# Patient Record
Sex: FEMALE | Race: WHITE | NOT HISPANIC OR LATINO | Employment: PART TIME | URBAN - METROPOLITAN AREA
[De-identification: names, ages, dates, MRNs, and addresses within clinical notes are randomized per-mention and may not be internally consistent; named-entity substitution may affect disease eponyms.]

---

## 2017-04-27 ENCOUNTER — INITIAL PRENATAL (OUTPATIENT)
Dept: OBSTETRICS AND GYNECOLOGY | Facility: CLINIC | Age: 27
End: 2017-04-27
Payer: MEDICAID

## 2017-04-27 VITALS
BODY MASS INDEX: 39.09 KG/M2 | SYSTOLIC BLOOD PRESSURE: 116 MMHG | WEIGHT: 257.06 LBS | DIASTOLIC BLOOD PRESSURE: 68 MMHG

## 2017-04-27 DIAGNOSIS — Z67.91 RH NEGATIVE STATUS DURING PREGNANCY IN FIRST TRIMESTER, ANTEPARTUM: ICD-10-CM

## 2017-04-27 DIAGNOSIS — O26.891 RH NEGATIVE STATUS DURING PREGNANCY IN FIRST TRIMESTER, ANTEPARTUM: ICD-10-CM

## 2017-04-27 DIAGNOSIS — O21.9 NAUSEA AND VOMITING IN PREGNANCY: ICD-10-CM

## 2017-04-27 DIAGNOSIS — Z32.00 POSSIBLE PREGNANCY, NOT CONFIRMED: Primary | ICD-10-CM

## 2017-04-27 LAB
B-HCG UR QL: POSITIVE
CTP QC/QA: YES

## 2017-04-27 PROCEDURE — 81025 URINE PREGNANCY TEST: CPT | Mod: PBBFAC | Performed by: OBSTETRICS & GYNECOLOGY

## 2017-04-27 PROCEDURE — 99999 PR PBB SHADOW E&M-EST. PATIENT-LVL II: CPT | Mod: PBBFAC,,, | Performed by: OBSTETRICS & GYNECOLOGY

## 2017-04-27 PROCEDURE — 99204 OFFICE O/P NEW MOD 45 MIN: CPT | Mod: TH,S$PBB,, | Performed by: OBSTETRICS & GYNECOLOGY

## 2017-04-27 PROCEDURE — 99212 OFFICE O/P EST SF 10 MIN: CPT | Mod: PBBFAC | Performed by: OBSTETRICS & GYNECOLOGY

## 2017-04-27 PROCEDURE — 87591 N.GONORRHOEAE DNA AMP PROB: CPT

## 2017-04-27 RX ORDER — PROMETHAZINE HYDROCHLORIDE 25 MG/1
25 TABLET ORAL EVERY 4 HOURS
Qty: 30 TABLET | Refills: 1 | Status: SHIPPED | OUTPATIENT
Start: 2017-04-27 | End: 2017-10-02

## 2017-04-27 NOTE — MR AVS SNAPSHOT
[facility] OB/  [address]  [facility] [address]  Phone: [phone]                  Charlene uBrgess   2017 1:40 PM   Initial Prenatal    Description:  Female : 1990   Provider:  Trenton Li MD   Department:  [facility] [place] - OB/ GYN           Reason for Visit     Initial Prenatal Visit           Diagnoses this Visit        Comments    Possible pregnancy, not confirmed    -  Primary     Rh negative status during pregnancy in first trimester, antepartum         Nausea and vomiting in pregnancy                To Do List           Future Appointments        Provider Department Dept Phone    2017 9:00 AM Trenton Li MD [facility] OB/ -[phone]      Goals (5 Years of Data)     None      Follow-Up and Disposition     Return in about 2 weeks (around 2017).       These Medications        Disp Refills Start End    prenatal vit27,calcium-iron-FA (VINATE ONE) 60 mg iron-1 mg Tab 30 tablet 6 2017    Take 1 capsule by mouth once daily. - Oral    Pharmacy: [facility] - [address] Ph #: [phone]       Notes to Pharmacy: Please give patient medication covered by her insurance    promethazine (PHENERGAN) 25 MG tablet 30 tablet 1 2017     Take 1 tablet (25 mg total) by mouth every 4 (four) hours. - Oral    Pharmacy: [facility] - [address] Ph #: [phone]         [facility]     [facility] On [facility] Care Line -  Assistance  Unless otherwise directed by your provider, please contact [facility], our nurse care line that is available for  assistance.     Registered nurses in the [facility] provide: appointment scheduling, clinical advisement, health education, and other advisory services.  Call: [phone] (toll free)               Medications           Message regarding Medications     Verify the changes and/or  additions to your medication regime listed below are the same as discussed with your clinician today.  If any of these changes or additions are incorrect, please notify your healthcare provider.        START taking these NEW medications        Refills    prenatal vit27,calcium-iron-FA (VINATE ONE) 60 mg iron-1 mg Tab 6    Sig: Take 1 capsule by mouth once daily.    Class: Normal    Route: Oral    promethazine (PHENERGAN) 25 MG tablet 1    Sig: Take 1 tablet (25 mg total) by mouth every 4 (four) hours.    Class: Normal    Route: Oral      STOP taking these medications     ondansetron (ZOFRAN) 4 MG tablet Take 1 tablet (4 mg total) by mouth every 8 (eight) hours as needed.    multivitamin (THERAGRAN) per tablet Take 1 tablet by mouth once daily.           Verify that the below list of medications is an accurate representation of the medications you are currently taking.  If none reported, the list may be blank. If incorrect, please contact your healthcare provider. Carry this list with you in case of emergency.           Current Medications     prenatal vit27,calcium-iron-FA (VINATE ONE) 60 mg iron-1 mg Tab Take 1 capsule by mouth once daily.    promethazine (PHENERGAN) 25 MG tablet Take 1 tablet (25 mg total) by mouth every 4 (four) hours.           Clinical Reference Information           Prenatal Vitals     Enc. Date GA Prenatal Vitals Prenatal Pulse Pain Level Urine Albumin/Glucose Edema Presentation Dilation/Effacement/Station    4/27/17 11w4d 116/68 / 116.6 kg (257 lb 0.9 oz)   0 Negative / Negative          TWG: 3.7 kg (8 lb 2.5 oz)   Pregravid weight: 112.9 kg (248 lb 14.4 oz)   Number of fetuses: 1       Your Vitals Were     BP Weight Last Period BMI       116/68 116.6 kg (257 lb 0.9 oz) 02/05/2017 (Exact Date) 39.09 kg/m2       Allergies as of 4/27/2017     Amoxicillin    Penicillins      Immunizations Administered on Date of Encounter - 4/27/2017     None      Orders Placed During Today's Visit      Normal  Orders This Visit    C. trachomatis/N. gonorrhoeae by AMP DNA Cervix     POCT urine pregnancy          4/27/2017  1:58 PM - Shamika Martinez MA      Component Results     Component Value Flag Ref Range Units Status    POC Preg Test, Ur Positive (A) Negative  Final     Acceptable Yes    Final            Language Assistance Services     ATTENTION: Language assistance services are available, free of charge. Please call [phone].      ATENCIÓN: Si habla español, tiene a bellamy disposición servicios gratuitos de asistencia lingüística. Llame al [phone].     [facility] Ý: N?u b?n nói Ti?ng Vi?t, có các d?ch v? h? tr? ngôn ng? mi?n phí dành cho b?n. G?i s? [phone].         [facility] [place] - OB/ GYN complies with applicable Federal civil rights laws and does not discriminate on the basis of race, color, national origin, age, disability, or sex.

## 2017-04-27 NOTE — PROGRESS NOTES
NOB here for initial prenatal care.  LMP: 2/5/17  JADON: 11/12/17 EGA: 11w 4d.  Pt went to [facility] on 04/15/2017 for pelvic pain with no bleeding but no longer with pains. batsheva

## 2017-04-27 NOTE — PROGRESS NOTES
Patient comes in today with her partner to begin care  Significant nausea  Previous ultrasound earlier in this pregnancy at [facility] shows possible twins.  No EDC given    Past Medical History:   Diagnosis Date    Kidney stone        Past Surgical History:   Procedure Laterality Date     SECTION      DILATION AND CURETTAGE OF UTERUS      WRIST SURGERY  left wrist    occured at age 13    WRIST SURGERY         Family History   Problem Relation Age of Onset    Diabetes Neg Hx     Hypertension Neg Hx        Social History     Social History    Marital status: Legally      Spouse name: N/A    Number of children: N/A    Years of education: N/A     Social History Main Topics    Smoking status: Former Smoker     Packs/day: 0.25     Types: Cigarettes    Smokeless tobacco: Never Used    Alcohol use No    Drug use: No    Sexual activity: Yes     Partners: Male     Other Topics Concern    None     Social History Narrative    Together since 2016       No current outpatient prescriptions on file.     No current facility-administered medications for this visit.        Review of patient's allergies indicates:   Allergen Reactions    Amoxicillin Anaphylaxis    Penicillins Itching     Unknown reaction     Review of Systems   Constitutional: Positive for fatigue. Negative for fever, chills, appetite change and unexpected weight change.   HENT: Positive for congestion. Negative for hearing loss, ear pain, sore throat, rhinorrhea, sneezing, mouth sores, neck pain, neck stiffness, voice change and postnasal drip.   Eyes: Negative for photophobia, itching and visual disturbance.   Respiratory: Negative for cough, chest tightness, shortness of breath and wheezing.   Cardiovascular: Negative for chest pain, palpitations and leg swelling.   Gastrointestinal: Positive for nausea, vomiting, abdominal pain and constipation. Negative for diarrhea, blood in stool and abdominal  "distention.   Genitourinary: Positive for vaginal discharge and pelvic pain. Negative for dysuria, urgency, frequency, hematuria, flank pain, decreased urine volume, vaginal bleeding, difficulty urinating, genital sores, vaginal pain, menstrual problem and dyspareunia.   Musculoskeletal: Positive for back pain. Negative for myalgias and joint swelling.   Skin: Negative for rash and wound.   Neurological: Positive for headaches. Negative for dizziness, tremors, syncope, speech difficulty, weakness, light-headedness and numbness.   Hematological: Negative for adenopathy. Does not bruise/bleed easily.   Psychiatric/Behavioral: Negative for suicidal ideas, hallucinations, confusion, sleep disturbance, dysphoric mood, decreased concentration and agitation. The patient is not nervous/anxious and is not hyperactive.     Exam with  Enlarged uterus at 12-14 weeks.  No FHT    A quick ultrasound performed.  Empty gestational sac seen abdominally.  Transvaginal ultrasound.  Viable fetus at 7w2d consistent with EDC of 12/12/2017    Patient states that her LMP "might be in March 2017"    Prenatal profile  Prenatal vitamins  Phenergan for nausea  Back in 2 weeks.    Diclegis sample given  "

## 2017-04-28 LAB
C TRACH DNA SPEC QL NAA+PROBE: NOT DETECTED
N GONORRHOEA DNA SPEC QL NAA+PROBE: NOT DETECTED

## 2017-05-04 ENCOUNTER — TELEPHONE (OUTPATIENT)
Dept: OBSTETRICS AND GYNECOLOGY | Facility: CLINIC | Age: 27
End: 2017-05-04

## 2017-05-04 NOTE — TELEPHONE ENCOUNTER
----- Message from Anna Fuchs sent at 5/4/2017  1:42 PM CDT -----  Contact: self  Pt returned office call        Thanks  ----------------------------------------------------------------------------  5/4/17 @ 4800  SPOKE WITH MS ZHAO, SHE STATED SHE IS HAVING A COLD/ALLERGY ATTACK, INSTRUCTED HER ON THE USE OF TYLENOL FOR A HEADACHE, FOR SINUS SHE CAN USE SUDAFED, BENADRYL, ACTIFED, CLARITIN , ZYRTEC W/O THE D.M. SHE CAN USE AFRIN NASAL SPRAY AS DIRECTED ONLY FOR 5 DAYS,  INFORMED HER THAT SHE CAN ALSO USE HOT TEA WITH LOCAL HONEY AND LEMON, PT STATED SHE IS ALSO USING MONISTAT CREAM FOR A YEAST INFECTION , INSTRUCTED HER THAT IS OK. PT STATED HER UNDERSTANDING

## 2017-05-04 NOTE — TELEPHONE ENCOUNTER
----- Message from Shayy Driscoll sent at 5/4/2017 11:19 AM CDT -----  Contact: self  Has congestion & headaches  . She thinks she is coming down with acold . Can something be called in ?   [phone]   LL

## 2017-05-12 ENCOUNTER — LAB VISIT (OUTPATIENT)
Dept: LAB | Facility: HOSPITAL | Age: 27
End: 2017-05-12
Attending: OBSTETRICS & GYNECOLOGY
Payer: MEDICAID

## 2017-05-12 ENCOUNTER — ROUTINE PRENATAL (OUTPATIENT)
Dept: OBSTETRICS AND GYNECOLOGY | Facility: CLINIC | Age: 27
End: 2017-05-12
Payer: MEDICAID

## 2017-05-12 VITALS
WEIGHT: 255.75 LBS | SYSTOLIC BLOOD PRESSURE: 110 MMHG | BODY MASS INDEX: 38.88 KG/M2 | DIASTOLIC BLOOD PRESSURE: 66 MMHG

## 2017-05-12 DIAGNOSIS — O26.891 RH NEGATIVE STATUS DURING PREGNANCY IN FIRST TRIMESTER, ANTEPARTUM: ICD-10-CM

## 2017-05-12 DIAGNOSIS — O26.841 UTERINE SIZE DATE DISCREPANCY, FIRST TRIMESTER: Primary | ICD-10-CM

## 2017-05-12 DIAGNOSIS — Z3A.09 9 WEEKS GESTATION OF PREGNANCY: ICD-10-CM

## 2017-05-12 DIAGNOSIS — O34.219 PREVIOUS CESAREAN SECTION COMPLICATING PREGNANCY, ANTEPARTUM CONDITION OR COMPLICATION: ICD-10-CM

## 2017-05-12 DIAGNOSIS — Z67.91 RH NEGATIVE STATUS DURING PREGNANCY IN FIRST TRIMESTER, ANTEPARTUM: ICD-10-CM

## 2017-05-12 DIAGNOSIS — O23.591 VAGINITIS AFFECTING PREGNANCY IN FIRST TRIMESTER, ANTEPARTUM: ICD-10-CM

## 2017-05-12 LAB
ABO + RH BLD: NORMAL
BASOPHILS # BLD AUTO: 0.01 K/UL
BASOPHILS NFR BLD: 0.1 %
BLD GP AB SCN CELLS X3 SERPL QL: NORMAL
DIFFERENTIAL METHOD: ABNORMAL
EOSINOPHIL # BLD AUTO: 0 K/UL
EOSINOPHIL NFR BLD: 0.6 %
ERYTHROCYTE [DISTWIDTH] IN BLOOD BY AUTOMATED COUNT: 12.8 %
HCT VFR BLD AUTO: 38.2 %
HGB BLD-MCNC: 12.4 G/DL
HGB S BLD QL SOLY: NEGATIVE
LYMPHOCYTES # BLD AUTO: 1 K/UL
LYMPHOCYTES NFR BLD: 15.1 %
MCH RBC QN AUTO: 28.5 PG
MCHC RBC AUTO-ENTMCNC: 32.5 %
MCV RBC AUTO: 88 FL
MONOCYTES # BLD AUTO: 0.5 K/UL
MONOCYTES NFR BLD: 7.3 %
NEUTROPHILS # BLD AUTO: 5.2 K/UL
NEUTROPHILS NFR BLD: 76.9 %
PLATELET # BLD AUTO: 255 K/UL
PMV BLD AUTO: 9.6 FL
RBC # BLD AUTO: 4.35 M/UL
WBC # BLD AUTO: 6.74 K/UL

## 2017-05-12 PROCEDURE — 99212 OFFICE O/P EST SF 10 MIN: CPT | Mod: TH,S$PBB,25, | Performed by: OBSTETRICS & GYNECOLOGY

## 2017-05-12 PROCEDURE — 86900 BLOOD TYPING SEROLOGIC ABO: CPT

## 2017-05-12 PROCEDURE — 87340 HEPATITIS B SURFACE AG IA: CPT

## 2017-05-12 PROCEDURE — 83036 HEMOGLOBIN GLYCOSYLATED A1C: CPT

## 2017-05-12 PROCEDURE — 36415 COLL VENOUS BLD VENIPUNCTURE: CPT

## 2017-05-12 PROCEDURE — 85025 COMPLETE CBC W/AUTO DIFF WBC: CPT

## 2017-05-12 PROCEDURE — 86703 HIV-1/HIV-2 1 RESULT ANTBDY: CPT

## 2017-05-12 PROCEDURE — 76817 TRANSVAGINAL US OBSTETRIC: CPT | Mod: 26,S$PBB,, | Performed by: OBSTETRICS & GYNECOLOGY

## 2017-05-12 PROCEDURE — 85660 RBC SICKLE CELL TEST: CPT

## 2017-05-12 PROCEDURE — 86901 BLOOD TYPING SEROLOGIC RH(D): CPT

## 2017-05-12 PROCEDURE — 86592 SYPHILIS TEST NON-TREP QUAL: CPT

## 2017-05-12 PROCEDURE — 86762 RUBELLA ANTIBODY: CPT

## 2017-05-12 PROCEDURE — 99999 PR PBB SHADOW E&M-EST. PATIENT-LVL II: CPT | Mod: PBBFAC,,, | Performed by: OBSTETRICS & GYNECOLOGY

## 2017-05-12 PROCEDURE — 86803 HEPATITIS C AB TEST: CPT

## 2017-05-12 NOTE — MR AVS SNAPSHOT
[facility] - OB/ GYN  [address]  [place] [address]  Phone: [phone]                  Charlene Burgess   2017 9:00 AM   Routine Prenatal    Description:  Female : 1990   Provider:  Trenton Li MD   Department:  [facility] [place] - OB/ GYN           Reason for Visit     Pregnancy Ultrasound                To Do List           Future Appointments        Provider Department Dept Phone    2017 10:00 AM Trenton Li MD [facility] [place] [facility]/ -[phone]      Goals (5 Years of Data)     None      [facility]     [facility] On [facility] Nurse Care Line -  Assistance  Unless otherwise directed by your provider, please contact [facility], our nurse care line that is available for  assistance.     Registered nurses in the [facility] provide: appointment scheduling, clinical advisement, health education, and other advisory services.  Call: [phone] (toll free)               Medications           Message regarding Medications     Verify the changes and/or additions to your medication regime listed below are the same as discussed with your clinician today.  If any of these changes or additions are incorrect, please notify your healthcare provider.             Verify that the below list of medications is an accurate representation of the medications you are currently taking.  If none reported, the list may be blank. If incorrect, please contact your healthcare provider. Carry this list with you in case of emergency.           Current Medications     prenatal vit27,calcium-iron-FA (VINATE ONE) 60 mg iron-1 mg Tab Take 1 capsule by mouth once daily.    promethazine (PHENERGAN) 25 MG tablet Take 1 tablet (25 mg total) by mouth every 4 (four) hours.           Clinical Reference Information           Prenatal Vitals     Enc. Date GA Prenatal Vitals Prenatal Pulse Pain Level Urine Albumin/Glucose Edema Presentation Dilation/Effacement/Station    17 13w5d 110/66 /  116 kg (255 lb 11.7 oz)   0 Negative / Negative       4/27/17 11w4d 116/68 / 116.6 kg (257 lb 0.9 oz)   0 Negative / Negative          TWG: 3.1 kg (6 lb 13.4 oz)   Pregravid weight: 112.9 kg (248 lb 14.4 oz)   Number of fetuses: 1       Your Vitals Were     BP Weight Last Period BMI       110/66 116 kg (255 lb 11.7 oz) 02/05/2017 (Exact Date) 38.88 kg/m2       Allergies as of 5/12/2017     Amoxicillin    Penicillins      Immunizations Administered on Date of Encounter - 5/12/2017     None      Language Assistance Services     ATTENTION: Language assistance services are available, free of charge. Please call [phone].      ATENCIÓN: Si jacksonla cal, tiene a bellamy disposición servicios gratuitos de asistencia lingüística. Llame al [phone].     [facility] Ý: N?u b?n nói Ti?ng Vi?t, có các d?ch v? h? tr? ngôn ng? mi?n phí dành cho b?n. G?i s? [phone].         [facility] [place] [facility]/ [facility]YN complies with applicable Federal civil rights laws and does not discriminate on the basis of race, color, national origin, age, disability, or sex.

## 2017-05-12 NOTE — PROGRESS NOTES
No new complaint  Still with some discharge and vulva irritation    Past Medical History:   Diagnosis Date    Kidney stone        Past Surgical History:   Procedure Laterality Date     SECTION      DILATION AND CURETTAGE OF UTERUS      WRIST SURGERY  left wrist    occured at age 13    WRIST SURGERY         Family History   Problem Relation Age of Onset    Diabetes Neg Hx     Hypertension Neg Hx        Social History     Social History    Marital status: Legally      Spouse name: N/A    Number of children: N/A    Years of education: N/A     Social History Main Topics    Smoking status: Former Smoker     Packs/day: 0.25     Types: Cigarettes    Smokeless tobacco: Never Used    Alcohol use No    Drug use: No    Sexual activity: Yes     Partners: Male     Other Topics Concern    None     Social History Narrative    Together since 2016       Current Outpatient Prescriptions   Medication Sig Dispense Refill    prenatal vit27,calcium-iron-FA (VINATE ONE) 60 mg iron-1 mg Tab Take 1 capsule by mouth once daily. 30 tablet 6    promethazine (PHENERGAN) 25 MG tablet Take 1 tablet (25 mg total) by mouth every 4 (four) hours. 30 tablet 1     No current facility-administered medications for this visit.        Review of patient's allergies indicates:   Allergen Reactions    Amoxicillin Anaphylaxis    Penicillins Itching     Unknown reaction     Exam normal.  Minimal discharge    A transvaginal ultrasound performed showing normal fetus at appropriate size.  We will keep her EDC on 12/10/2017  RCS at 2017    Prenatal profile  Prenatal vitamins  Back in 4 weeks

## 2017-05-13 ENCOUNTER — TELEPHONE (OUTPATIENT)
Dept: OBSTETRICS AND GYNECOLOGY | Facility: CLINIC | Age: 27
End: 2017-05-13

## 2017-05-13 DIAGNOSIS — B95.1 GBS (GROUP B STREPTOCOCCUS) UTI COMPLICATING PREGNANCY, FIRST TRIMESTER: Primary | ICD-10-CM

## 2017-05-13 DIAGNOSIS — O23.41 GBS (GROUP B STREPTOCOCCUS) UTI COMPLICATING PREGNANCY, FIRST TRIMESTER: Primary | ICD-10-CM

## 2017-05-13 LAB
BACTERIA UR CULT: NORMAL
CANDIDA RRNA VAG QL PROBE: NEGATIVE
G VAGINALIS RRNA GENITAL QL PROBE: POSITIVE
RPR SER QL: NORMAL
T VAGINALIS RRNA GENITAL QL PROBE: NEGATIVE

## 2017-05-13 RX ORDER — CLINDAMYCIN HYDROCHLORIDE 300 MG/1
300 CAPSULE ORAL EVERY 8 HOURS
Qty: 21 CAPSULE | Refills: 0 | Status: SHIPPED | OUTPATIENT
Start: 2017-05-13 | End: 2017-05-20

## 2017-05-14 LAB
ESTIMATED AVG GLUCOSE: 97 MG/DL
HBA1C MFR BLD HPLC: 5 %

## 2017-05-15 LAB
HBV SURFACE AG SERPL QL IA: NEGATIVE
HCV AB SERPL QL IA: NEGATIVE
HIV 1+2 AB+HIV1 P24 AG SERPL QL IA: NEGATIVE
RUBV IGG SER-ACNC: 103 IU/ML
RUBV IGG SER-IMP: REACTIVE

## 2017-05-21 ENCOUNTER — NURSE TRIAGE (OUTPATIENT)
Dept: ADMINISTRATIVE | Facility: CLINIC | Age: 27
End: 2017-05-21

## 2017-05-21 NOTE — TELEPHONE ENCOUNTER
"  Reason for Disposition   Mild constipation (all triage questions negative)   Last bowel movement (BM) > 4 days ago    Answer Assessment - Initial Assessment Questions  1. STOOL PATTERN OR FREQUENCY: "How often do you pass bowel movements (BMs)?"  (Normal range: tid to q 3 days)  "When was the last BM passed?"        q 3 days  2. STRAINING: "Do you have to strain to have a BM?"      yes  3. RECTAL PAIN: "Does your rectum hurt when the stool comes out?" If so, ask: "Do you have hemorrhoids? How bad is the pain?"  (Scale 1-10; or mild, moderate, severe)      Moderate-severe  4. STOOL COMPOSITION: "Are the stools hard?"       unknown  5. BLOOD ON STOOLS: "Has there been any blood on the toilet tissue or on the surface of the BM?" If so, ask: "When was the last time?"     no  6. CHRONIC CONSTIPATION: "Is this a new problem for you?"  If no, ask: How long have you had this problem?" (days, weeks, months)     One week  7. CHANGES IN DIET: "Have there been any recent changes in your diet?"      no  8. MEDICATIONS: "Have you been taking any new medications?" "What type of prenatal vitamins are you taking?"      Yes PNV  9. LAXATIVES: "Have you been using any laxatives or enemas?"  If yes, ask "What, how often, and when was the last time?"  no  10. OTHER SYMPTOMS: "Do you have any other symptoms?" (e.g., abdominal pain, fever, vaginal bleeding, decreased fetal movement)     Upper abdominal pain, gassy  11. JADON: "What date are you expecting to deliver?"      12/5/17    Protocols used:  PREGNANCY - CONSTIPATION-A-AH    "

## 2017-05-22 ENCOUNTER — TELEPHONE (OUTPATIENT)
Dept: OBSTETRICS AND GYNECOLOGY | Facility: CLINIC | Age: 27
End: 2017-05-22

## 2017-05-22 NOTE — TELEPHONE ENCOUNTER
----- Message from Shayy Driscoll sent at 5/22/2017 11:23 AM CDT -----  Contact: SELF  Is there anything she can take for constipation ?       [phone]    LL

## 2017-05-22 NOTE — TELEPHONE ENCOUNTER
Called and spoke with pt regarding her issue.  Pt was asked if she has looked into the OB bag given to her on her last visit.  Pt admitted that she has not.  She was instructed to look at the materials in the bag and also, she can take any laxative that is OTC for quick relief since she's been constipated for 5 days now.  Pt is recommended to increase fiber and water in her daily diet.  Pt voiced understanding and will call if needs any advice soon. batsheva

## 2017-05-23 ENCOUNTER — ROUTINE PRENATAL (OUTPATIENT)
Dept: OBSTETRICS AND GYNECOLOGY | Facility: CLINIC | Age: 27
End: 2017-05-23
Payer: MEDICAID

## 2017-05-23 ENCOUNTER — TELEPHONE (OUTPATIENT)
Dept: OBSTETRICS AND GYNECOLOGY | Facility: CLINIC | Age: 27
End: 2017-05-23

## 2017-05-23 VITALS
BODY MASS INDEX: 39.05 KG/M2 | DIASTOLIC BLOOD PRESSURE: 78 MMHG | SYSTOLIC BLOOD PRESSURE: 116 MMHG | WEIGHT: 256.81 LBS

## 2017-05-23 DIAGNOSIS — O23.41 GBS (GROUP B STREPTOCOCCUS) UTI COMPLICATING PREGNANCY, FIRST TRIMESTER: ICD-10-CM

## 2017-05-23 DIAGNOSIS — B95.1 GBS (GROUP B STREPTOCOCCUS) UTI COMPLICATING PREGNANCY, FIRST TRIMESTER: ICD-10-CM

## 2017-05-23 DIAGNOSIS — O21.9 NAUSEA AND VOMITING IN PREGNANCY: ICD-10-CM

## 2017-05-23 DIAGNOSIS — Z67.91 RH NEGATIVE STATUS DURING PREGNANCY IN FIRST TRIMESTER, ANTEPARTUM: ICD-10-CM

## 2017-05-23 DIAGNOSIS — O34.219 PREVIOUS CESAREAN SECTION COMPLICATING PREGNANCY, ANTEPARTUM CONDITION OR COMPLICATION: Primary | ICD-10-CM

## 2017-05-23 DIAGNOSIS — O26.891 RH NEGATIVE STATUS DURING PREGNANCY IN FIRST TRIMESTER, ANTEPARTUM: ICD-10-CM

## 2017-05-23 DIAGNOSIS — Z3A.11 11 WEEKS GESTATION OF PREGNANCY: ICD-10-CM

## 2017-05-23 PROCEDURE — 99212 OFFICE O/P EST SF 10 MIN: CPT | Mod: PBBFAC | Performed by: OBSTETRICS & GYNECOLOGY

## 2017-05-23 PROCEDURE — 99999 PR PBB SHADOW E&M-EST. PATIENT-LVL II: CPT | Mod: PBBFAC,,, | Performed by: OBSTETRICS & GYNECOLOGY

## 2017-05-23 PROCEDURE — 99213 OFFICE O/P EST LOW 20 MIN: CPT | Mod: TH,S$PBB,, | Performed by: OBSTETRICS & GYNECOLOGY

## 2017-05-23 NOTE — TELEPHONE ENCOUNTER
----- Message from Karleedesmond Robb sent at 5/23/2017  8:35 AM CDT -----  Contact: self   Pt called requesting an appointment today with Dr. Li. She said that she is having stomach pain and vomiting since last night and is feeling very week. She is 11 wks pregnant. Please contact the pt at [phone]. Thanks!

## 2017-05-23 NOTE — PROGRESS NOTES
In pain.  Tired.  Nausea and vomiting  Antiemetics not working.  Just sedating    Exam with normal active fetus    Samples of Diclegis  Off of work until next week  Back next week

## 2017-05-23 NOTE — LETTER
May 23, 2017               [facility] OB/ GYN  Obstetrics and Gynecology  [address]  [place] [address]  Phone: [phone]   May 23, 2017     Patient: Charlene Burgess   YOB: 1990   Date of Visit: 5/23/2017       To Whom it May Concern:    Charlene Burgess was seen in my clinic on 5/23/2017. It is recommended that she is placed on bedrest for the rest of the week.   She will follow up with me on Monday 05/29/17 and will be release then.    If you have any questions or concerns, please don't hesitate to call.    Sincerely,           Trenton Li M.D.

## 2017-05-29 ENCOUNTER — ROUTINE PRENATAL (OUTPATIENT)
Dept: OBSTETRICS AND GYNECOLOGY | Facility: CLINIC | Age: 27
End: 2017-05-29
Payer: MEDICAID

## 2017-05-29 VITALS
SYSTOLIC BLOOD PRESSURE: 116 MMHG | DIASTOLIC BLOOD PRESSURE: 72 MMHG | WEIGHT: 257.06 LBS | BODY MASS INDEX: 39.09 KG/M2

## 2017-05-29 DIAGNOSIS — O34.219 PREVIOUS CESAREAN SECTION COMPLICATING PREGNANCY, ANTEPARTUM CONDITION OR COMPLICATION: Primary | ICD-10-CM

## 2017-05-29 DIAGNOSIS — R82.71 GBS BACTERIURIA: ICD-10-CM

## 2017-05-29 DIAGNOSIS — Z67.91 RH NEGATIVE STATUS DURING PREGNANCY IN FIRST TRIMESTER, ANTEPARTUM: ICD-10-CM

## 2017-05-29 DIAGNOSIS — O26.891 RH NEGATIVE STATUS DURING PREGNANCY IN FIRST TRIMESTER, ANTEPARTUM: ICD-10-CM

## 2017-05-29 DIAGNOSIS — Z3A.12 12 WEEKS GESTATION OF PREGNANCY: ICD-10-CM

## 2017-05-29 PROCEDURE — 99999 PR PBB SHADOW E&M-EST. PATIENT-LVL II: CPT | Mod: PBBFAC,,, | Performed by: OBSTETRICS & GYNECOLOGY

## 2017-05-29 PROCEDURE — 99212 OFFICE O/P EST SF 10 MIN: CPT | Mod: PBBFAC | Performed by: OBSTETRICS & GYNECOLOGY

## 2017-05-29 PROCEDURE — 99213 OFFICE O/P EST LOW 20 MIN: CPT | Mod: TH,S$PBB,, | Performed by: OBSTETRICS & GYNECOLOGY

## 2017-05-29 RX ORDER — VITAMIN A ACETATE, BETA CAROTENE, ASCORBIC ACID, CHOLECALCIFEROL, .ALPHA.-TOCOPHEROL ACETATE, DL-, THIAMINE MONONITRATE, RIBOFLAVIN, NIACINAMIDE, PYRIDOXINE HYDROCHLORIDE, FOLIC ACID, CYANOCOBALAMIN, CALCIUM CARBONATE, FERROUS FUMARATE, ZINC OXIDE, CUPRIC OXIDE 3080; 12; 120; 400; 1; 1.84; 3; 20; 22; 920; 25; 200; 27; 10; 2 [IU]/1; UG/1; MG/1; [IU]/1; MG/1; MG/1; MG/1; MG/1; MG/1; [IU]/1; MG/1; MG/1; MG/1; MG/1; MG/1
TABLET, FILM COATED ORAL
COMMUNITY
Start: 2017-04-27 | End: 2017-06-26 | Stop reason: SDUPTHER

## 2017-06-26 ENCOUNTER — LAB VISIT (OUTPATIENT)
Dept: LAB | Facility: HOSPITAL | Age: 27
End: 2017-06-26
Attending: OBSTETRICS & GYNECOLOGY
Payer: MEDICAID

## 2017-06-26 ENCOUNTER — ROUTINE PRENATAL (OUTPATIENT)
Dept: OBSTETRICS AND GYNECOLOGY | Facility: CLINIC | Age: 27
End: 2017-06-26
Payer: MEDICAID

## 2017-06-26 VITALS
SYSTOLIC BLOOD PRESSURE: 118 MMHG | WEIGHT: 265.19 LBS | BODY MASS INDEX: 40.33 KG/M2 | DIASTOLIC BLOOD PRESSURE: 76 MMHG

## 2017-06-26 DIAGNOSIS — O26.891 RH NEGATIVE STATUS DURING PREGNANCY IN FIRST TRIMESTER, ANTEPARTUM: ICD-10-CM

## 2017-06-26 DIAGNOSIS — R82.71 GBS BACTERIURIA: ICD-10-CM

## 2017-06-26 DIAGNOSIS — Z3A.16 16 WEEKS GESTATION OF PREGNANCY: Primary | ICD-10-CM

## 2017-06-26 DIAGNOSIS — Z67.91 RH NEGATIVE STATUS DURING PREGNANCY IN FIRST TRIMESTER, ANTEPARTUM: ICD-10-CM

## 2017-06-26 DIAGNOSIS — Z3A.16 16 WEEKS GESTATION OF PREGNANCY: ICD-10-CM

## 2017-06-26 DIAGNOSIS — O34.219 PREVIOUS CESAREAN SECTION COMPLICATING PREGNANCY, ANTEPARTUM CONDITION OR COMPLICATION: ICD-10-CM

## 2017-06-26 PROCEDURE — 99213 OFFICE O/P EST LOW 20 MIN: CPT | Mod: TH,S$PBB,, | Performed by: OBSTETRICS & GYNECOLOGY

## 2017-06-26 PROCEDURE — 99999 PR PBB SHADOW E&M-EST. PATIENT-LVL II: CPT | Mod: PBBFAC,,, | Performed by: OBSTETRICS & GYNECOLOGY

## 2017-06-26 PROCEDURE — 36415 COLL VENOUS BLD VENIPUNCTURE: CPT

## 2017-06-26 PROCEDURE — 81511 FTL CGEN ABNOR FOUR ANAL: CPT

## 2017-06-26 RX ORDER — VITAMIN A ACETATE, BETA CAROTENE, ASCORBIC ACID, CHOLECALCIFEROL, .ALPHA.-TOCOPHEROL ACETATE, DL-, THIAMINE MONONITRATE, RIBOFLAVIN, NIACINAMIDE, PYRIDOXINE HYDROCHLORIDE, FOLIC ACID, CYANOCOBALAMIN, CALCIUM CARBONATE, FERROUS FUMARATE, ZINC OXIDE, CUPRIC OXIDE 3080; 12; 120; 400; 1; 1.84; 3; 20; 22; 920; 25; 200; 27; 10; 2 [IU]/1; UG/1; MG/1; [IU]/1; MG/1; MG/1; MG/1; MG/1; MG/1; [IU]/1; MG/1; MG/1; MG/1; MG/1; MG/1
1 TABLET, FILM COATED ORAL DAILY
Qty: 30 TABLET | Refills: 6 | Status: ON HOLD | OUTPATIENT
Start: 2017-06-26 | End: 2017-11-30 | Stop reason: HOSPADM

## 2017-06-27 LAB
# FETUSES US: NORMAL
2ND TRIMESTER 4 SCREEN PNL SERPL: NEGATIVE
2ND TRIMESTER 4 SCREEN SERPL-IMP: NORMAL
AFP MOM SERPL: 0.86
AFP SERPL-MCNC: 17.9 NG/ML
AGE AT DELIVERY: 27
B-HCG MOM SERPL: 0.69
B-HCG SERPL-ACNC: 18.7 IU/ML
FET TS 21 RISK FROM MAT AGE: NORMAL
GA (DAYS): 6 D
GA (WEEKS): 15 WK
GA METHOD: NORMAL
IDDM PATIENT QL: NORMAL
INHIBIN A MOM SERPL: 1.29
INHIBIN A SERPL-MCNC: 160.2 PG/ML
TS 18 RISK FETUS: NORMAL
TS 21 RISK FETUS: NORMAL
U ESTRIOL MOM SERPL: 1.42
U ESTRIOL SERPL-MCNC: 0.89 NG/ML

## 2017-07-24 ENCOUNTER — ROUTINE PRENATAL (OUTPATIENT)
Dept: OBSTETRICS AND GYNECOLOGY | Facility: CLINIC | Age: 27
End: 2017-07-24
Payer: MEDICAID

## 2017-07-24 VITALS
BODY MASS INDEX: 41.23 KG/M2 | WEIGHT: 271.19 LBS | DIASTOLIC BLOOD PRESSURE: 64 MMHG | SYSTOLIC BLOOD PRESSURE: 122 MMHG

## 2017-07-24 DIAGNOSIS — Z67.91 RH NEGATIVE STATE IN ANTEPARTUM PERIOD, SECOND TRIMESTER: ICD-10-CM

## 2017-07-24 DIAGNOSIS — O34.219 PREVIOUS CESAREAN SECTION COMPLICATING PREGNANCY, ANTEPARTUM CONDITION OR COMPLICATION: ICD-10-CM

## 2017-07-24 DIAGNOSIS — Z3A.20 20 WEEKS GESTATION OF PREGNANCY: ICD-10-CM

## 2017-07-24 DIAGNOSIS — O26.892 RH NEGATIVE STATE IN ANTEPARTUM PERIOD, SECOND TRIMESTER: ICD-10-CM

## 2017-07-24 DIAGNOSIS — R82.71 GBS BACTERIURIA: ICD-10-CM

## 2017-07-24 DIAGNOSIS — Z36.89 ENCOUNTER FOR ULTRASOUND TO CHECK FETAL GROWTH: Primary | ICD-10-CM

## 2017-07-24 PROCEDURE — 99999 PR PBB SHADOW E&M-EST. PATIENT-LVL II: CPT | Mod: PBBFAC,,, | Performed by: OBSTETRICS & GYNECOLOGY

## 2017-07-24 PROCEDURE — 76805 OB US >/= 14 WKS SNGL FETUS: CPT | Mod: 26,S$PBB,, | Performed by: OBSTETRICS & GYNECOLOGY

## 2017-07-24 PROCEDURE — 76805 OB US >/= 14 WKS SNGL FETUS: CPT | Mod: PBBFAC | Performed by: OBSTETRICS & GYNECOLOGY

## 2017-07-24 PROCEDURE — 99212 OFFICE O/P EST SF 10 MIN: CPT | Mod: PBBFAC | Performed by: OBSTETRICS & GYNECOLOGY

## 2017-07-24 PROCEDURE — 99213 OFFICE O/P EST LOW 20 MIN: CPT | Mod: TH,S$PBB,, | Performed by: OBSTETRICS & GYNECOLOGY

## 2017-07-24 NOTE — PROGRESS NOTES
No new complaint    A transabdominal ultrasound performed showing normal fetus.  Probably female.  Anterior low-lying vs marginal previa    Repeat ultrasound in 6 weeks  Rhogam at 28 weeks  Tdap at 27 weeks  O'Bravo at 24 weeks  Pelvic rest for now with no heavy lifting.    Back in 4 weeks

## 2017-07-31 ENCOUNTER — TELEPHONE (OUTPATIENT)
Dept: OBSTETRICS AND GYNECOLOGY | Facility: CLINIC | Age: 27
End: 2017-07-31

## 2017-07-31 NOTE — TELEPHONE ENCOUNTER
----- Message from Maite Soriano sent at 7/31/2017  8:11 AM CDT -----  Patient is calling to verify she can fly on Thursday. Please call at [phone] Thank you!

## 2017-07-31 NOTE — TELEPHONE ENCOUNTER
Spoke to pt and informed her if by far she has been having a healthy pregnancy it's safe for her to fly but check with the airline company to make sure there is nothing on their end she may need to complete. She mentioned the only thing Dr. Li told her is not to lift anything over 10 pounds. Voiced my understanding as well as the pt. kt

## 2017-08-11 ENCOUNTER — TELEPHONE (OUTPATIENT)
Dept: OBSTETRICS AND GYNECOLOGY | Facility: CLINIC | Age: 27
End: 2017-08-11

## 2017-08-11 ENCOUNTER — HOSPITAL ENCOUNTER (OUTPATIENT)
Facility: HOSPITAL | Age: 27
Discharge: HOME | End: 2017-08-11
Attending: OBSTETRICS & GYNECOLOGY | Admitting: OBSTETRICS & GYNECOLOGY
Payer: MEDICAID

## 2017-08-11 DIAGNOSIS — Z34.92 PREGNANCY WITH ONE FETUS, SECOND TRIMESTER: ICD-10-CM

## 2017-08-11 DIAGNOSIS — Z67.91 RH NEGATIVE STATUS DURING PREGNANCY IN FIRST TRIMESTER, ANTEPARTUM: ICD-10-CM

## 2017-08-11 DIAGNOSIS — Z3A.22 22 WEEKS GESTATION OF PREGNANCY: Primary | ICD-10-CM

## 2017-08-11 DIAGNOSIS — O34.219 PREVIOUS CESAREAN SECTION COMPLICATING PREGNANCY, ANTEPARTUM CONDITION OR COMPLICATION: ICD-10-CM

## 2017-08-11 DIAGNOSIS — N93.9 VAGINAL SPOTTING: ICD-10-CM

## 2017-08-11 DIAGNOSIS — O26.891 RH NEGATIVE STATUS DURING PREGNANCY IN FIRST TRIMESTER, ANTEPARTUM: ICD-10-CM

## 2017-08-11 DIAGNOSIS — R82.71 GBS BACTERIURIA: ICD-10-CM

## 2017-08-11 PROCEDURE — 99213 OFFICE O/P EST LOW 20 MIN: CPT | Mod: TH,,, | Performed by: OBSTETRICS & GYNECOLOGY

## 2017-08-11 NOTE — LETTER
August 11, 2017         [place] [address]  Phone: [phone]       Patient: Charlene Burgess   YOB: 1990  Date of Visit: 08/11/2017    To Whom It May Concern:    Keith Burgess  was at [facility] on 08/11/2017. She   may return to work/school after her next appointment on 8/22/17 restrictions. If you have any questions or concerns, or if I can be of further assistance, please do not hesitate to contact me.    Sincerely,    Kelli Biggs V RN

## 2017-08-11 NOTE — PROGRESS NOTES
Report to Dr. Li, re spotting times two days with pink fluid.... No pain. Informed of low lying placenta. New orders noted...

## 2017-08-11 NOTE — HPI
"28 yo  at 22w5d  History of low-lying placenta  Has been spotting for the past two days.  "Just pink"  No contractions.  No rupture of membranes  Good fetal movements.    "

## 2017-08-11 NOTE — HOSPITAL COURSE
On Labor and Delivery, no active bleeding noted  Vitals stable  Exam normal with closed cervix and no blood visible    OB ultrasound with normal fetus.  No previa.    Good growth  No evidence of placental abruption    Will discharge home with pelvic rest  Appointment to return to our office on 8/22/2017  Notes given to be out of work until then.

## 2017-08-11 NOTE — TELEPHONE ENCOUNTER
----- Message from Keira Gramajo sent at 8/11/2017 11:13 AM CDT -----  Contact: self  Patient called back to let office know she is headed to ED. Please note.     Thanks!

## 2017-08-11 NOTE — DISCHARGE INSTRUCTIONS
Home Undelivered Discharge Instructions    After Discharge Orders:    Future Appointments  Date Time Provider Department Center   8/21/2017 1:00 PM [facility]   9/18/2017 1:00 PM [facility]   9/18/2017 2:00 PM INJECTION, [facility]-GY[facility]   10/2/2017 2:00 PM [facility]   10/16/2017 2:00 PM [facility]   10/30/2017 2:00 PM [facility]   11/13/2017 2:00 PM [facility]   11/20/2017 1:00 PM [facility]   11/27/2017 1:00 PM Trenton Li MD [facility]   12/4/2017 1:00 PM [facility]   12/11/2017 1:00 PM Trenton Li MD [facility]       Call physician for any problems    Current Discharge Medication List      CONTINUE these medications which have NOT CHANGED    Details   PRENATAL PLUS, CALCIUM CARB, 27 mg iron- 1 mg Tab Take 1 tablet (1 each total) by mouth once daily.  Qty: 30 tablet, Refills: 6    Associated Diagnoses: 16 weeks gestation of pregnancy      promethazine (PHENERGAN) 25 MG tablet Take 1 tablet (25 mg total) by mouth every 4 (four) hours.  Qty: 30 tablet, Refills: 1    Associated Diagnoses: Nausea and vomiting in pregnancy                     · Diet:  normal diet as tolerated    · Rest: normal activity as tolerated and no sex    Other instructions: Do kick counts once a day on your baby. Choose the time of day your baby is most active. Get in a comfortable lying or sitting position and time how long it takes to feel 10 kicks, twists, turns, swishes, or rolls. Call your physician or midwife if there have not been 10 kicks in 1 hours    Call physician or midwife, return to Labor and Delivery, call 911, or go to the nearest Emergency Room if: increased leakage or fluid, contractions more than  5 per  1 hour, decreased fetal movement, persistent low  back pain or cramping, bleeding from vaginal area, difficulty urinating, pain with urination, difficulty breathing or new calf pain

## 2017-08-11 NOTE — PROGRESS NOTES
Dr. Li here to see patient, sve done, lower limb swelling addressed. Patient to be discharged home. Discharge instructions given, all questions answered.... Patient and family member ambulated from unit.... No s/s distress noted.

## 2017-08-11 NOTE — PROGRESS NOTES
To room, states she just returned from [place] and had pink spotting yesterday and today.... No cramping or discomfort noted. Stated she was told she had a low lying placenta..... Dr. Li called, report given, new orders noted. Us ordered...

## 2017-08-11 NOTE — DISCHARGE SUMMARY
"[facility]  Obstetrics  Discharge Summary      Patient Name: Charlene Burgess  MRN: [id number]  Admission Date: 2017  Hospital Length of Stay: 0 days  Discharge Date and Time:  2017 2:10 PM  Attending Physician: Trenton Li MD   Discharging Provider: Trenton Li MD  Primary Care Provider: Ranjeet Coffman III, MD    HPI: 28 yo  at 22w5d  History of low-lying placenta  Has been spotting for the past two days.  "Just pink"  No contractions.  No rupture of membranes  Good fetal movements.      * No surgery found *     Hospital Course:   On Labor and Delivery, no active bleeding noted  Vitals stable  Exam normal with closed cervix and no blood visible    OB ultrasound with normal fetus.  No previa.    Good growth  No evidence of placental abruption    Will discharge home with pelvic rest  Appointment to return to our office on 2017  Notes given to be out of work until then.            Final Active Diagnoses:    Diagnosis Date Noted POA    PRINCIPAL PROBLEM:  Vaginal spotting [N92.0] 2017 Yes    22 weeks gestation of pregnancy [Z3A.22] 2017 Not Applicable      Problems Resolved During this Admission:    Diagnosis Date Noted Date Resolved POA        Labs: CBC No results for input(s): WBC, HGB, HCT, PLT in the last 48 hours. and All labs within the past 24 hours have been reviewed        Immunizations     None          This patient has no babies on file.  Pending Diagnostic Studies:     Procedure Component Value Units Date/Time     OB More Than 14 Wks First Gestation [[id number]]     Order Status:  Sent Lab Status:  In process           Discharged Condition: good    Disposition: Home or Self Care    Follow Up:  Follow-up Information     Trenton Li MD In 2 weeks.    Specialties:  Obstetrics and Gynecology, Obstetrics and Gynecology  Contact information:  [address] [place] [id number] [phone]                 Patient Instructions:     Diet " general     Activity as tolerated     Call MD for:  temperature >100.4     Call MD for:  persistent nausea and vomiting or diarrhea     Call MD for:  severe uncontrolled pain     Call MD for:  redness, tenderness, or signs of infection (pain, swelling, redness, odor or green/yellow discharge around incision site)     Call MD for:  difficulty breathing or increased cough     Call MD for:  severe persistent headache     Call MD for:  worsening rash     Call MD for:  persistent dizziness, light-headedness, or visual disturbances     Call MD for:  increased confusion or weakness     No dressing needed       Medications:  Current Discharge Medication List      CONTINUE these medications which have NOT CHANGED    Details   PRENATAL PLUS, CALCIUM CARB, 27 mg iron- 1 mg Tab Take 1 tablet (1 each total) by mouth once daily.  Qty: 30 tablet, Refills: 6    Associated Diagnoses: 16 weeks gestation of pregnancy      promethazine (PHENERGAN) 25 MG tablet Take 1 tablet (25 mg total) by mouth every 4 (four) hours.  Qty: 30 tablet, Refills: 1    Associated Diagnoses: Nausea and vomiting in pregnancy             Trenton Li MD  Obstetrics  [facility]

## 2017-08-11 NOTE — TELEPHONE ENCOUNTER
----- Message from Jorge Velázquez sent at 8/11/2017  8:50 AM CDT -----  Contact: Self/[phone]  Patient states that she's spotting. She would like to know if she needed to schedule an appointment. Thank you.  ---------------------------------------------  8/11/17 @ 1046A ([facility])  SPOKE WITH PT , SHE STATED SHE HAD SEEN DR WAITE ON 7/24/17 SHE STATED HE SAID SHE HAS PLACENTA PREVIA, TODAY AND YESTERDAY SHE IS SPOTTING , NO PAIN, INSTRUCTED HER TO GO TO E.R AND THEY WILL TAKE HER TO L& D TO BE CHECKED , PT STATED SHE IS GOING NOW,     WILL FORWARD MESSAGE TO DR WAITE

## 2017-08-21 ENCOUNTER — ROUTINE PRENATAL (OUTPATIENT)
Dept: OBSTETRICS AND GYNECOLOGY | Facility: CLINIC | Age: 27
End: 2017-08-21
Payer: MEDICAID

## 2017-08-21 VITALS — WEIGHT: 278.88 LBS | SYSTOLIC BLOOD PRESSURE: 118 MMHG | BODY MASS INDEX: 42.4 KG/M2 | DIASTOLIC BLOOD PRESSURE: 70 MMHG

## 2017-08-21 DIAGNOSIS — O34.219 PREVIOUS CESAREAN SECTION COMPLICATING PREGNANCY, ANTEPARTUM CONDITION OR COMPLICATION: ICD-10-CM

## 2017-08-21 DIAGNOSIS — O26.892 RH NEGATIVE STATE IN ANTEPARTUM PERIOD, SECOND TRIMESTER: ICD-10-CM

## 2017-08-21 DIAGNOSIS — Z67.91 RH NEGATIVE STATE IN ANTEPARTUM PERIOD, SECOND TRIMESTER: ICD-10-CM

## 2017-08-21 DIAGNOSIS — Z3A.24 24 WEEKS GESTATION OF PREGNANCY: Primary | ICD-10-CM

## 2017-08-21 PROCEDURE — 99213 OFFICE O/P EST LOW 20 MIN: CPT | Mod: TH,S$PBB,, | Performed by: OBSTETRICS & GYNECOLOGY

## 2017-08-21 PROCEDURE — 99212 OFFICE O/P EST SF 10 MIN: CPT | Mod: PBBFAC | Performed by: OBSTETRICS & GYNECOLOGY

## 2017-08-21 PROCEDURE — 3008F BODY MASS INDEX DOCD: CPT | Mod: ,,, | Performed by: OBSTETRICS & GYNECOLOGY

## 2017-08-21 PROCEDURE — 99999 PR PBB SHADOW E&M-EST. PATIENT-LVL II: CPT | Mod: PBBFAC,,, | Performed by: OBSTETRICS & GYNECOLOGY

## 2017-08-21 NOTE — PROGRESS NOTES
Having pain in left leg and arm.  Normal exam  Discussed diet and weight gain    Total time: 30 minutes    Back in 2 weeks

## 2017-08-21 NOTE — PROGRESS NOTES
OB here for routine exam. CBC & 1 hour glucose orders given today. Pt complaining of numbness is left arm and leg. jtn

## 2017-08-22 ENCOUNTER — LAB VISIT (OUTPATIENT)
Dept: LAB | Facility: HOSPITAL | Age: 27
End: 2017-08-22
Attending: OBSTETRICS & GYNECOLOGY
Payer: MEDICAID

## 2017-08-22 DIAGNOSIS — Z3A.24 24 WEEKS GESTATION OF PREGNANCY: ICD-10-CM

## 2017-08-22 LAB
BASOPHILS # BLD AUTO: 0.01 K/UL
BASOPHILS NFR BLD: 0.1 %
DIFFERENTIAL METHOD: ABNORMAL
EOSINOPHIL # BLD AUTO: 0.1 K/UL
EOSINOPHIL NFR BLD: 1.2 %
ERYTHROCYTE [DISTWIDTH] IN BLOOD BY AUTOMATED COUNT: 13.3 %
GLUCOSE SERPL-MCNC: 142 MG/DL
HCT VFR BLD AUTO: 35.2 %
HGB BLD-MCNC: 11.4 G/DL
LYMPHOCYTES # BLD AUTO: 1.3 K/UL
LYMPHOCYTES NFR BLD: 15.3 %
MCH RBC QN AUTO: 28.1 PG
MCHC RBC AUTO-ENTMCNC: 32.4 G/DL
MCV RBC AUTO: 87 FL
MONOCYTES # BLD AUTO: 0.5 K/UL
MONOCYTES NFR BLD: 5.8 %
NEUTROPHILS # BLD AUTO: 6.5 K/UL
NEUTROPHILS NFR BLD: 77.6 %
PLATELET # BLD AUTO: 220 K/UL
PMV BLD AUTO: 9.3 FL
RBC # BLD AUTO: 4.05 M/UL
WBC # BLD AUTO: 8.43 K/UL

## 2017-08-22 PROCEDURE — 82950 GLUCOSE TEST: CPT

## 2017-08-22 PROCEDURE — 85025 COMPLETE CBC W/AUTO DIFF WBC: CPT

## 2017-08-22 PROCEDURE — 36415 COLL VENOUS BLD VENIPUNCTURE: CPT

## 2017-08-24 ENCOUNTER — TELEPHONE (OUTPATIENT)
Dept: OBSTETRICS AND GYNECOLOGY | Facility: CLINIC | Age: 27
End: 2017-08-24

## 2017-08-24 DIAGNOSIS — K90.41 GLUTEN INTOLERANCE: Primary | ICD-10-CM

## 2017-09-13 ENCOUNTER — ROUTINE PRENATAL (OUTPATIENT)
Dept: OBSTETRICS AND GYNECOLOGY | Facility: CLINIC | Age: 27
End: 2017-09-13
Payer: MEDICAID

## 2017-09-13 ENCOUNTER — TELEPHONE (OUTPATIENT)
Dept: OBSTETRICS AND GYNECOLOGY | Facility: CLINIC | Age: 27
End: 2017-09-13

## 2017-09-13 VITALS — BODY MASS INDEX: 42.2 KG/M2 | SYSTOLIC BLOOD PRESSURE: 120 MMHG | DIASTOLIC BLOOD PRESSURE: 68 MMHG | WEIGHT: 277.56 LBS

## 2017-09-13 DIAGNOSIS — Z3A.27 27 WEEKS GESTATION OF PREGNANCY: Primary | ICD-10-CM

## 2017-09-13 DIAGNOSIS — B97.7 HPV IN FEMALE: ICD-10-CM

## 2017-09-13 DIAGNOSIS — O26.892 RH NEGATIVE STATE IN ANTEPARTUM PERIOD, SECOND TRIMESTER: ICD-10-CM

## 2017-09-13 DIAGNOSIS — O34.219 PREVIOUS CESAREAN SECTION COMPLICATING PREGNANCY, ANTEPARTUM CONDITION OR COMPLICATION: ICD-10-CM

## 2017-09-13 DIAGNOSIS — Z67.91 RH NEGATIVE STATE IN ANTEPARTUM PERIOD, SECOND TRIMESTER: ICD-10-CM

## 2017-09-13 DIAGNOSIS — O99.810 GLUCOSE INTOLERANCE OF PREGNANCY: ICD-10-CM

## 2017-09-13 PROCEDURE — 99999 PR PBB SHADOW E&M-EST. PATIENT-LVL II: CPT | Mod: PBBFAC,,, | Performed by: OBSTETRICS & GYNECOLOGY

## 2017-09-13 PROCEDURE — 3008F BODY MASS INDEX DOCD: CPT | Mod: ,,, | Performed by: OBSTETRICS & GYNECOLOGY

## 2017-09-13 PROCEDURE — 99213 OFFICE O/P EST LOW 20 MIN: CPT | Mod: TH,S$PBB,, | Performed by: OBSTETRICS & GYNECOLOGY

## 2017-09-13 PROCEDURE — 99212 OFFICE O/P EST SF 10 MIN: CPT | Mod: PBBFAC | Performed by: OBSTETRICS & GYNECOLOGY

## 2017-09-13 NOTE — PROGRESS NOTES
Having lesion on posterior fourchette  Exam with possible flat warts on posterior fourchette    Will monitor growth  RhoGAM  3hr OGTT  Tdap next visit    Back in 2 weeks

## 2017-09-13 NOTE — TELEPHONE ENCOUNTER
----- Message from Shayy Driscoll sent at 9/13/2017  8:15 AM CDT -----  Contact: self  Requesting an appt for today . She states she has many sores in the vaginal area . She thinks it could be genital warts .          [phone]       LL

## 2017-09-15 ENCOUNTER — HOSPITAL ENCOUNTER (OUTPATIENT)
Dept: OBSTETRICS AND GYNECOLOGY | Facility: HOSPITAL | Age: 27
Discharge: HOME | End: 2017-09-15
Attending: OBSTETRICS & GYNECOLOGY
Payer: MEDICAID

## 2017-09-15 DIAGNOSIS — Z3A.27 27 WEEKS GESTATION OF PREGNANCY: ICD-10-CM

## 2017-09-15 LAB — INJECT RH IG VOL PATIENT: NORMAL ML

## 2017-09-15 PROCEDURE — 63600519 RHOGAM PHARM REV CODE 636 ALT 250 W HCPCS: Performed by: OBSTETRICS & GYNECOLOGY

## 2017-09-15 PROCEDURE — 96372 THER/PROPH/DIAG INJ SC/IM: CPT

## 2017-09-15 RX ADMIN — HUMAN RHO(D) IMMUNE GLOBULIN 300 MCG: 300 INJECTION, SOLUTION INTRAMUSCULAR at 11:09

## 2017-09-18 ENCOUNTER — CLINICAL SUPPORT (OUTPATIENT)
Dept: OBSTETRICS AND GYNECOLOGY | Facility: CLINIC | Age: 27
End: 2017-09-18
Payer: MEDICAID

## 2017-09-18 VITALS — WEIGHT: 278 LBS | SYSTOLIC BLOOD PRESSURE: 138 MMHG | BODY MASS INDEX: 42.27 KG/M2 | DIASTOLIC BLOOD PRESSURE: 72 MMHG

## 2017-09-18 DIAGNOSIS — Z23 NEED FOR DIPHTHERIA-TETANUS-PERTUSSIS (TDAP) VACCINE, ADULT/ADOLESCENT: Primary | ICD-10-CM

## 2017-09-18 PROCEDURE — 99999 PR PBB SHADOW E&M-EST. PATIENT-LVL II: CPT | Mod: PBBFAC,,,

## 2017-09-18 PROCEDURE — 90715 TDAP VACCINE 7 YRS/> IM: CPT | Mod: PBBFAC,SL

## 2017-09-18 PROCEDURE — 90471 IMMUNIZATION ADMIN: CPT | Mod: PBBFAC,VFC

## 2017-09-18 PROCEDURE — 99212 OFFICE O/P EST SF 10 MIN: CPT | Mod: PBBFAC,25

## 2017-09-18 NOTE — PROGRESS NOTES
Pt here for T- DAP injection, explained what the medication is for, TDAP handout given to pt,  reviewed documentation of medication with pt, injection given via L Deltoid w/o difficulty. Pt stated her understanding of all instructions.

## 2017-09-20 ENCOUNTER — TELEPHONE (OUTPATIENT)
Dept: OBSTETRICS AND GYNECOLOGY | Facility: CLINIC | Age: 27
End: 2017-09-20

## 2017-09-20 NOTE — TELEPHONE ENCOUNTER
----- Message from Jackianshul Rahman sent at 9/20/2017  1:51 PM CDT -----  Contact: self  Patient had a tdap done yesterday and her arm is swollen today?  Patient would like to talk with someone . Patient can be reached at [phone].        Thanks,  -------------------------------------------------------------  9/20/17 @ 1400P ([facility] [place] [facility])  SPOKE WITH MS ZHAO , SHE STATED THAT HER ARM IS SWOLLEN , WITH REDDNESS , AND THERE IS A SMALL KNOT UNDER HER SKIN, INSTRUCTED PT TO APPLY ICE PACK Q 15 MINUTES FOR 1 HR, THEN , WAIT AND HR OR 2 AND REAPPLY ICE AS FIRST INSTRUCTED, INFORMED HER SHE CAN TAKE REGULAR TYLENOL 325MG 1-2 Q4-6HRS FOR PAIN , PT STATED ONLY MILD TENDERNESS, AND SHE CAN TAKE BENADRYL 25 MG FOR ALLERGIC REACTION 1 Q 6HRS AS NEEDED, INSTRUCTED HER TO CALL BACK IN THE MORNING TO LET US KNOW HOW SHE IS DOING , AND IF IT GETS WORSE THROUGH THE EVENING TO GO TO E.R., PT STATED HER UNDERSTANDING,    MESSAGE FORWARDED TO DR WAITE FOR SUSY

## 2017-10-02 ENCOUNTER — ROUTINE PRENATAL (OUTPATIENT)
Dept: OBSTETRICS AND GYNECOLOGY | Facility: CLINIC | Age: 27
End: 2017-10-02
Payer: MEDICAID

## 2017-10-02 VITALS
WEIGHT: 283.94 LBS | DIASTOLIC BLOOD PRESSURE: 74 MMHG | SYSTOLIC BLOOD PRESSURE: 120 MMHG | BODY MASS INDEX: 43.17 KG/M2

## 2017-10-02 DIAGNOSIS — O26.893 RH NEGATIVE STATE IN ANTEPARTUM PERIOD, THIRD TRIMESTER: ICD-10-CM

## 2017-10-02 DIAGNOSIS — O34.219 PREVIOUS CESAREAN SECTION COMPLICATING PREGNANCY, ANTEPARTUM CONDITION OR COMPLICATION: ICD-10-CM

## 2017-10-02 DIAGNOSIS — Z3A.30 30 WEEKS GESTATION OF PREGNANCY: Primary | ICD-10-CM

## 2017-10-02 DIAGNOSIS — Z67.91 RH NEGATIVE STATE IN ANTEPARTUM PERIOD, THIRD TRIMESTER: ICD-10-CM

## 2017-10-02 PROCEDURE — 99212 OFFICE O/P EST SF 10 MIN: CPT | Mod: PBBFAC | Performed by: OBSTETRICS & GYNECOLOGY

## 2017-10-02 PROCEDURE — 99999 PR PBB SHADOW E&M-EST. PATIENT-LVL II: CPT | Mod: PBBFAC,,, | Performed by: OBSTETRICS & GYNECOLOGY

## 2017-10-02 PROCEDURE — 99213 OFFICE O/P EST LOW 20 MIN: CPT | Mod: TH,S$PBB,, | Performed by: OBSTETRICS & GYNECOLOGY

## 2017-10-02 PROCEDURE — 3008F BODY MASS INDEX DOCD: CPT | Mod: ,,, | Performed by: OBSTETRICS & GYNECOLOGY

## 2017-10-02 NOTE — PROGRESS NOTES
Tired.  Cristina.  Insomnia  RCS/BTL scheduled for 12/5/2017 at 0730 AM  ELIS tubal consent today  Back in 2 weeks    Discussed weight gain

## 2017-10-10 ENCOUNTER — HOSPITAL ENCOUNTER (OUTPATIENT)
Facility: HOSPITAL | Age: 27
LOS: 1 days | Discharge: HOME | End: 2017-10-10
Attending: OBSTETRICS & GYNECOLOGY | Admitting: OBSTETRICS & GYNECOLOGY
Payer: MEDICAID

## 2017-10-10 VITALS
BODY MASS INDEX: 39.87 KG/M2 | TEMPERATURE: 98 F | WEIGHT: 254 LBS | RESPIRATION RATE: 18 BRPM | HEART RATE: 90 BPM | DIASTOLIC BLOOD PRESSURE: 56 MMHG | HEIGHT: 67 IN | SYSTOLIC BLOOD PRESSURE: 119 MMHG

## 2017-10-10 DIAGNOSIS — R10.9 ABDOMINAL PAIN AFFECTING PREGNANCY, ANTEPARTUM: Primary | ICD-10-CM

## 2017-10-10 DIAGNOSIS — O26.899 ABDOMINAL PAIN AFFECTING PREGNANCY, ANTEPARTUM: Primary | ICD-10-CM

## 2017-10-10 PROBLEM — Z3A.22 22 WEEKS GESTATION OF PREGNANCY: Status: RESOLVED | Noted: 2017-08-11 | Resolved: 2017-10-10

## 2017-10-10 PROBLEM — N93.9 VAGINAL SPOTTING: Status: RESOLVED | Noted: 2017-08-11 | Resolved: 2017-10-10

## 2017-10-10 PROBLEM — Z3A.31 31 WEEKS GESTATION OF PREGNANCY: Status: ACTIVE | Noted: 2017-10-10

## 2017-10-10 PROBLEM — O26.893 RH NEGATIVE STATE IN ANTEPARTUM PERIOD, THIRD TRIMESTER: Status: ACTIVE | Noted: 2017-04-27

## 2017-10-10 LAB
AMORPH CRY URNS QL MICRO: ABNORMAL
BACTERIA #/AREA URNS HPF: ABNORMAL /HPF
BILIRUB UR QL STRIP: NEGATIVE
CLARITY UR: ABNORMAL
COLOR UR: YELLOW
GLUCOSE UR QL STRIP: NEGATIVE
HGB UR QL STRIP: NEGATIVE
KETONES UR QL STRIP: NEGATIVE
LEUKOCYTE ESTERASE UR QL STRIP: ABNORMAL
MICROSCOPIC COMMENT: ABNORMAL
NITRITE UR QL STRIP: NEGATIVE
NON-SQ EPI CELLS #/AREA URNS HPF: 1 /HPF
PH UR STRIP: 7 [PH] (ref 5–8)
PROT UR QL STRIP: NEGATIVE
SP GR UR STRIP: 1.01 (ref 1–1.03)
SQUAMOUS #/AREA URNS HPF: 20 /HPF
URN SPEC COLLECT METH UR: ABNORMAL
UROBILINOGEN UR STRIP-ACNC: NEGATIVE EU/DL
WBC #/AREA URNS HPF: 3 /HPF (ref 0–5)

## 2017-10-10 PROCEDURE — 99213 OFFICE O/P EST LOW 20 MIN: CPT | Mod: TH,,, | Performed by: OBSTETRICS & GYNECOLOGY

## 2017-10-10 PROCEDURE — 81000 URINALYSIS NONAUTO W/SCOPE: CPT

## 2017-10-10 RX ORDER — ACETAMINOPHEN 500 MG
500 TABLET ORAL EVERY 6 HOURS PRN
Status: DISCONTINUED | OUTPATIENT
Start: 2017-10-10 | End: 2017-10-10 | Stop reason: HOSPADM

## 2017-10-10 RX ORDER — ONDANSETRON 8 MG/1
8 TABLET, ORALLY DISINTEGRATING ORAL EVERY 8 HOURS PRN
Status: DISCONTINUED | OUTPATIENT
Start: 2017-10-10 | End: 2017-10-10 | Stop reason: HOSPADM

## 2017-10-10 NOTE — HOSPITAL COURSE
On Labor and Delivery, no obvious contractions  No bleeding  Vitals stable    Exam with closed cervix and no fundal tenderness

## 2017-10-10 NOTE — DISCHARGE INSTRUCTIONS
Home Undelivered Discharge Instructions    After Discharge Orders:    Future Appointments  Date Time Provider Department Center   10/16/2017 2:00 PM Trenton Li MD [facility]   10/30/2017 2:00 PM [facility]   11/13/2017 2:00 PM Trenton Li MD [facility]   11/20/2017 1:00 PM Trenton Li MD [facility] [place] [facility]   11/27/2017 1:00 PM Trenton Li MD [facility] [place]   12/4/2017 1:00 PM Trenton Li MD [facility]   12/11/2017 1:00 PM [facility] Li [facility]       Call physician or midwife's office on for instructions.    Current Discharge Medication List      CONTINUE these medications which have NOT CHANGED    Details   PRENATAL PLUS, CALCIUM CARB, 27 mg iron- 1 mg Tab Take 1 tablet (1 each total) by mouth once daily.  Qty: 30 tablet, Refills: 6    Associated Diagnoses: 16 weeks gestation of pregnancy                     · Diet:  normal diet as tolerated    · Rest: bed rest, up only to bathroom    Other instructions: Do kick counts once a day on your baby. Choose the time of day your baby is most active. Get in a comfortable lying or sitting position and time how long it takes to feel 10 kicks, twists, turns, swishes, or rolls. Call your physician or midwife if there have not been 10 kicks in 2 hours    Call physician or midwife, return to Labor and Delivery, call 911, or go to the nearest Emergency Room if: increased leakage or fluid, decreased fetal movement, persistent low back pain or cramping, bleeding from vaginal area, difficulty urinating, pain with urination, difficulty breathing, new calf pain, persistent headache or vision change

## 2017-10-10 NOTE — NURSING
No signs and symptoms of PTL Report to  re:u/a results.Discharge order noted.Written discharge instructions provided and reviewed.Discharged home in stable condition.

## 2017-10-10 NOTE — HPI
26 yo  at 31w2d  Came in today with abdominal pain since earlier today, 10/10/2017  No contractions.  No vaginal bleeding.  No rupture of membranes  Good fetal movements

## 2017-10-16 ENCOUNTER — ROUTINE PRENATAL (OUTPATIENT)
Dept: OBSTETRICS AND GYNECOLOGY | Facility: CLINIC | Age: 27
End: 2017-10-16
Payer: MEDICAID

## 2017-10-16 VITALS
DIASTOLIC BLOOD PRESSURE: 66 MMHG | SYSTOLIC BLOOD PRESSURE: 110 MMHG | WEIGHT: 288.13 LBS | BODY MASS INDEX: 45.13 KG/M2

## 2017-10-16 DIAGNOSIS — O34.219 PREVIOUS CESAREAN SECTION COMPLICATING PREGNANCY, ANTEPARTUM CONDITION OR COMPLICATION: ICD-10-CM

## 2017-10-16 DIAGNOSIS — Z67.91 RH NEGATIVE STATE IN ANTEPARTUM PERIOD, THIRD TRIMESTER: ICD-10-CM

## 2017-10-16 DIAGNOSIS — O26.893 RH NEGATIVE STATE IN ANTEPARTUM PERIOD, THIRD TRIMESTER: ICD-10-CM

## 2017-10-16 DIAGNOSIS — Z3A.32 32 WEEKS GESTATION OF PREGNANCY: Primary | ICD-10-CM

## 2017-10-16 PROBLEM — Z3A.31 31 WEEKS GESTATION OF PREGNANCY: Status: RESOLVED | Noted: 2017-10-10 | Resolved: 2017-10-16

## 2017-10-16 PROCEDURE — 99999 PR PBB SHADOW E&M-EST. PATIENT-LVL II: CPT | Mod: PBBFAC,,, | Performed by: OBSTETRICS & GYNECOLOGY

## 2017-10-16 PROCEDURE — 99213 OFFICE O/P EST LOW 20 MIN: CPT | Mod: TH,S$PBB,, | Performed by: OBSTETRICS & GYNECOLOGY

## 2017-10-16 PROCEDURE — 99212 OFFICE O/P EST SF 10 MIN: CPT | Mod: PBBFAC | Performed by: OBSTETRICS & GYNECOLOGY

## 2017-10-16 NOTE — PROGRESS NOTES
Still with pain.  No bleeding or rupture of membranes    Discussed diet and weight gain  Discussed repeat  section with tubal ligation    Back in 2 weeks

## 2017-10-25 ENCOUNTER — HOSPITAL ENCOUNTER (OUTPATIENT)
Facility: HOSPITAL | Age: 27
Discharge: HOME | End: 2017-10-25
Attending: OBSTETRICS & GYNECOLOGY | Admitting: OBSTETRICS & GYNECOLOGY
Payer: MEDICAID

## 2017-10-25 VITALS
WEIGHT: 258 LBS | HEIGHT: 67 IN | BODY MASS INDEX: 40.49 KG/M2 | DIASTOLIC BLOOD PRESSURE: 60 MMHG | TEMPERATURE: 98 F | SYSTOLIC BLOOD PRESSURE: 120 MMHG | HEART RATE: 75 BPM

## 2017-10-25 DIAGNOSIS — O26.899 ABDOMINAL PAIN AFFECTING PREGNANCY, ANTEPARTUM: ICD-10-CM

## 2017-10-25 DIAGNOSIS — R10.9 ABDOMINAL PAIN AFFECTING PREGNANCY, ANTEPARTUM: ICD-10-CM

## 2017-10-25 PROCEDURE — 99211 OFF/OP EST MAY X REQ PHY/QHP: CPT

## 2017-10-25 PROCEDURE — 25000003 PHARM REV CODE 250: Performed by: OBSTETRICS & GYNECOLOGY

## 2017-10-25 RX ORDER — ACETAMINOPHEN AND CODEINE PHOSPHATE 300; 30 MG/1; MG/1
1 TABLET ORAL EVERY 4 HOURS PRN
Status: DISCONTINUED | OUTPATIENT
Start: 2017-10-25 | End: 2017-10-25 | Stop reason: HOSPADM

## 2017-10-25 RX ADMIN — ACETAMINOPHEN AND CODEINE PHOSPHATE 1 TABLET: 30; 300 TABLET ORAL at 09:10

## 2017-10-25 NOTE — NURSING
Report to . Orders noted.Written discharge instructions provided and reviewed.Discharged home in stable cond.

## 2017-10-25 NOTE — DISCHARGE INSTRUCTIONS
Home Undelivered Discharge Instructions    After Discharge Orders:    Future Appointments  Date Time Provider Department Center   10/30/2017 2:00 PM [facility]   11/13/2017 2:00 PM [facility]   11/21/2017 1:10 PM [facility]   11/27/2017 1:00 PM [facility]   12/4/2017 1:00 PM [facility]   12/11/2017 1:00 PM Trenton Li MD [facility]       Call physician or midwife's office    for instructions.    Current Discharge Medication List      CONTINUE these medications which have NOT CHANGED    Details   PRENATAL PLUS, CALCIUM CARB, 27 mg iron- 1 mg Tab Take 1 tablet (1 each total) by mouth once daily.  Qty: 30 tablet, Refills: 6    Associated Diagnoses: 16 weeks gestation of pregnancy                     · Diet:  normal diet as tolerated    · Rest: normal activity as tolerated    Other instructions: Do kick counts once a day on your baby. Choose the time of day your baby is most active. Get in a comfortable lying or sitting position and time how long it takes to feel 10 kicks, twists, turns, swishes, or rolls. Call your physician or midwife if there have not been 10 kicks in    hours    Call physician or midwife, return to Labor and Delivery, call 911, or go to the nearest Emergency Room if: increased leakage or fluid, contractions more than  4 per  1 hour, decreased fetal movement, persistent low back pain or cramping, bleeding from vaginal area, difficulty urinating, pain with urination, difficulty breathing, new calf pain, persistent headache or vision change

## 2017-10-25 NOTE — NURSING
Admit c/o back and abd. pain that she states is unbearable.Abd soft with active fetus.Assisted with comfort measures.

## 2017-10-30 ENCOUNTER — TELEPHONE (OUTPATIENT)
Dept: OBSTETRICS AND GYNECOLOGY | Facility: CLINIC | Age: 27
End: 2017-10-30

## 2017-10-30 NOTE — TELEPHONE ENCOUNTER
Spoke and she stated one of her cars broke down and her  needed to use her car so she won't be able to make in today. Advised her she need to be seen before her next apt on 11/13. Apt was rescheduled to 11/3 for 8:50. kt

## 2017-10-30 NOTE — TELEPHONE ENCOUNTER
----- Message from Shayy Driscoll sent at 10/30/2017  8:43 AM CDT -----  Contact: self  Patient is unable to keep appt for today . Should she r/s or just keep the one that is scheduled for November ?      [phone]   LL

## 2017-11-03 ENCOUNTER — ROUTINE PRENATAL (OUTPATIENT)
Dept: OBSTETRICS AND GYNECOLOGY | Facility: CLINIC | Age: 27
End: 2017-11-03
Payer: MEDICAID

## 2017-11-03 VITALS
SYSTOLIC BLOOD PRESSURE: 120 MMHG | BODY MASS INDEX: 44.75 KG/M2 | WEIGHT: 285.69 LBS | DIASTOLIC BLOOD PRESSURE: 72 MMHG

## 2017-11-03 DIAGNOSIS — O34.219 PREVIOUS CESAREAN SECTION COMPLICATING PREGNANCY, ANTEPARTUM CONDITION OR COMPLICATION: ICD-10-CM

## 2017-11-03 DIAGNOSIS — Z67.91 RH NEGATIVE STATE IN ANTEPARTUM PERIOD, THIRD TRIMESTER: ICD-10-CM

## 2017-11-03 DIAGNOSIS — O26.893 RH NEGATIVE STATE IN ANTEPARTUM PERIOD, THIRD TRIMESTER: ICD-10-CM

## 2017-11-03 DIAGNOSIS — Z3A.34 34 WEEKS GESTATION OF PREGNANCY: Primary | ICD-10-CM

## 2017-11-03 DIAGNOSIS — Z23 INFLUENZA VACCINATION ADMINISTERED AT CURRENT VISIT: ICD-10-CM

## 2017-11-03 PROCEDURE — 90686 IIV4 VACC NO PRSV 0.5 ML IM: CPT | Mod: PBBFAC

## 2017-11-03 PROCEDURE — 90656 IIV3 VACC NO PRSV 0.5 ML IM: CPT | Mod: PBBFAC | Performed by: OBSTETRICS & GYNECOLOGY

## 2017-11-03 PROCEDURE — 99999 PR PBB SHADOW E&M-EST. PATIENT-LVL II: CPT | Mod: PBBFAC,,, | Performed by: OBSTETRICS & GYNECOLOGY

## 2017-11-03 PROCEDURE — 99212 OFFICE O/P EST SF 10 MIN: CPT | Mod: PBBFAC,25 | Performed by: OBSTETRICS & GYNECOLOGY

## 2017-11-03 PROCEDURE — 99213 OFFICE O/P EST LOW 20 MIN: CPT | Mod: TH,S$PBB,, | Performed by: OBSTETRICS & GYNECOLOGY

## 2017-11-03 NOTE — PROGRESS NOTES
"Feeling a little better in her back after being "adjusted" by her chiropractor  But hips still hurt.  More active, walking a little more    Back in 2 weeks    "

## 2017-11-13 ENCOUNTER — ROUTINE PRENATAL (OUTPATIENT)
Dept: OBSTETRICS AND GYNECOLOGY | Facility: CLINIC | Age: 27
End: 2017-11-13
Payer: MEDICAID

## 2017-11-13 VITALS
BODY MASS INDEX: 45.61 KG/M2 | DIASTOLIC BLOOD PRESSURE: 74 MMHG | SYSTOLIC BLOOD PRESSURE: 124 MMHG | WEIGHT: 291.25 LBS

## 2017-11-13 DIAGNOSIS — O34.219 PREVIOUS CESAREAN SECTION COMPLICATING PREGNANCY, ANTEPARTUM CONDITION OR COMPLICATION: ICD-10-CM

## 2017-11-13 DIAGNOSIS — O26.893 RH NEGATIVE STATE IN ANTEPARTUM PERIOD, THIRD TRIMESTER: ICD-10-CM

## 2017-11-13 DIAGNOSIS — I83.892 VARICOSE VEINS OF LEFT LEG WITH EDEMA: ICD-10-CM

## 2017-11-13 DIAGNOSIS — Z67.91 RH NEGATIVE STATE IN ANTEPARTUM PERIOD, THIRD TRIMESTER: ICD-10-CM

## 2017-11-13 DIAGNOSIS — Z3A.36 36 WEEKS GESTATION OF PREGNANCY: Primary | ICD-10-CM

## 2017-11-13 PROCEDURE — 99213 OFFICE O/P EST LOW 20 MIN: CPT | Mod: TH,S$PBB,, | Performed by: OBSTETRICS & GYNECOLOGY

## 2017-11-13 PROCEDURE — 99999 PR PBB SHADOW E&M-EST. PATIENT-LVL II: CPT | Mod: PBBFAC,,, | Performed by: OBSTETRICS & GYNECOLOGY

## 2017-11-13 PROCEDURE — 99212 OFFICE O/P EST SF 10 MIN: CPT | Mod: PBBFAC | Performed by: OBSTETRICS & GYNECOLOGY

## 2017-11-13 NOTE — PROGRESS NOTES
Left lower leg even more swollen and painful    Exam with significant varicose veins.  Tender.  No obvious cord    HIV  Doppler of lower leg  Back next week    RCS with BTL scheduled for 12/5/2017 at 0730 AM

## 2017-11-21 ENCOUNTER — ROUTINE PRENATAL (OUTPATIENT)
Dept: OBSTETRICS AND GYNECOLOGY | Facility: CLINIC | Age: 27
End: 2017-11-21
Payer: MEDICAID

## 2017-11-21 VITALS — SYSTOLIC BLOOD PRESSURE: 122 MMHG | WEIGHT: 293 LBS | DIASTOLIC BLOOD PRESSURE: 72 MMHG | BODY MASS INDEX: 46.1 KG/M2

## 2017-11-21 DIAGNOSIS — Z67.91 RH NEGATIVE STATE IN ANTEPARTUM PERIOD, THIRD TRIMESTER: ICD-10-CM

## 2017-11-21 DIAGNOSIS — Z3A.37 37 WEEKS GESTATION OF PREGNANCY: Primary | ICD-10-CM

## 2017-11-21 DIAGNOSIS — O26.893 RH NEGATIVE STATE IN ANTEPARTUM PERIOD, THIRD TRIMESTER: ICD-10-CM

## 2017-11-21 DIAGNOSIS — I83.892 VARICOSE VEINS OF LEFT LEG WITH EDEMA: ICD-10-CM

## 2017-11-21 DIAGNOSIS — O34.219 PREVIOUS CESAREAN SECTION COMPLICATING PREGNANCY, ANTEPARTUM CONDITION OR COMPLICATION: ICD-10-CM

## 2017-11-21 PROCEDURE — 99999 PR PBB SHADOW E&M-EST. PATIENT-LVL II: CPT | Mod: PBBFAC,,, | Performed by: OBSTETRICS & GYNECOLOGY

## 2017-11-21 PROCEDURE — 99212 OFFICE O/P EST SF 10 MIN: CPT | Mod: PBBFAC | Performed by: OBSTETRICS & GYNECOLOGY

## 2017-11-21 PROCEDURE — 99213 OFFICE O/P EST LOW 20 MIN: CPT | Mod: TH,S$PBB,, | Performed by: OBSTETRICS & GYNECOLOGY

## 2017-11-21 NOTE — PROGRESS NOTES
No new complaint  Still with pedal edema and irritation  Not worse    Exam with closed cervix    Labor precautions  Back next week  Planned RCS + BTL on 12/4/2017.

## 2017-11-21 NOTE — PROGRESS NOTES
OB here for routine exam.  Pt with pelvic pain and pressure.  Pt having trouble schedule an appointment for leg pains. batsheva

## 2017-11-27 ENCOUNTER — ANESTHESIA (OUTPATIENT)
Dept: OBSTETRICS AND GYNECOLOGY | Facility: HOSPITAL | Age: 27
End: 2017-11-27
Payer: MEDICAID

## 2017-11-27 ENCOUNTER — HOSPITAL ENCOUNTER (INPATIENT)
Facility: HOSPITAL | Age: 27
LOS: 3 days | Discharge: HOME | End: 2017-11-30
Attending: OBSTETRICS & GYNECOLOGY | Admitting: OBSTETRICS & GYNECOLOGY
Payer: MEDICAID

## 2017-11-27 ENCOUNTER — ANESTHESIA EVENT (OUTPATIENT)
Dept: OBSTETRICS AND GYNECOLOGY | Facility: HOSPITAL | Age: 27
End: 2017-11-27
Payer: MEDICAID

## 2017-11-27 ENCOUNTER — TELEPHONE (OUTPATIENT)
Dept: OBSTETRICS AND GYNECOLOGY | Facility: CLINIC | Age: 27
End: 2017-11-27

## 2017-11-27 ENCOUNTER — SURGERY (OUTPATIENT)
Age: 27
End: 2017-11-27

## 2017-11-27 VITALS — OXYGEN SATURATION: 99 % | HEART RATE: 82 BPM | SYSTOLIC BLOOD PRESSURE: 117 MMHG | DIASTOLIC BLOOD PRESSURE: 57 MMHG

## 2017-11-27 DIAGNOSIS — Z98.891 STATUS POST CESAREAN SECTION: Primary | ICD-10-CM

## 2017-11-27 DIAGNOSIS — O12.03 LEG SWELLING IN PREGNANCY IN THIRD TRIMESTER: ICD-10-CM

## 2017-11-27 DIAGNOSIS — Z34.90 PREGNANCY: ICD-10-CM

## 2017-11-27 DIAGNOSIS — Z3A.38 38 WEEKS GESTATION OF PREGNANCY: ICD-10-CM

## 2017-11-27 LAB
ABO + RH BLD: NORMAL
BASOPHILS # BLD AUTO: 0.02 K/UL
BASOPHILS NFR BLD: 0.2 %
BLD GP AB SCN CELLS X3 SERPL QL: NORMAL
DIFFERENTIAL METHOD: ABNORMAL
EOSINOPHIL # BLD AUTO: 0.1 K/UL
EOSINOPHIL NFR BLD: 1.2 %
ERYTHROCYTE [DISTWIDTH] IN BLOOD BY AUTOMATED COUNT: 13.4 %
HCT VFR BLD AUTO: 34.3 %
HGB BLD-MCNC: 11.4 G/DL
LYMPHOCYTES # BLD AUTO: 1.7 K/UL
LYMPHOCYTES NFR BLD: 16.9 %
MCH RBC QN AUTO: 28.6 PG
MCHC RBC AUTO-ENTMCNC: 33.2 G/DL
MCV RBC AUTO: 86 FL
MONOCYTES # BLD AUTO: 0.8 K/UL
MONOCYTES NFR BLD: 7.5 %
NEUTROPHILS # BLD AUTO: 7.4 K/UL
NEUTROPHILS NFR BLD: 74.2 %
PLATELET # BLD AUTO: 240 K/UL
PMV BLD AUTO: 9.9 FL
RBC # BLD AUTO: 3.99 M/UL
RH BLD: NORMAL
WBC # BLD AUTO: 9.96 K/UL

## 2017-11-27 PROCEDURE — 0UN90ZZ RELEASE UTERUS, OPEN APPROACH: ICD-10-PCS | Performed by: OBSTETRICS & GYNECOLOGY

## 2017-11-27 PROCEDURE — 63600175 PHARM REV CODE 636 W HCPCS: Performed by: NURSE ANESTHETIST, CERTIFIED REGISTERED

## 2017-11-27 PROCEDURE — 25000003 PHARM REV CODE 250: Performed by: OBSTETRICS & GYNECOLOGY

## 2017-11-27 PROCEDURE — S0028 INJECTION, FAMOTIDINE, 20 MG: HCPCS | Performed by: ANESTHESIOLOGY

## 2017-11-27 PROCEDURE — S0077 INJECTION, CLINDAMYCIN PHOSP: HCPCS | Performed by: OBSTETRICS & GYNECOLOGY

## 2017-11-27 PROCEDURE — 51702 INSERT TEMP BLADDER CATH: CPT

## 2017-11-27 PROCEDURE — 99233 SBSQ HOSP IP/OBS HIGH 50: CPT | Mod: ,,, | Performed by: OBSTETRICS & GYNECOLOGY

## 2017-11-27 PROCEDURE — 0UL70CZ OCCLUSION OF BILATERAL FALLOPIAN TUBES WITH EXTRALUMINAL DEVICE, OPEN APPROACH: ICD-10-PCS | Performed by: OBSTETRICS & GYNECOLOGY

## 2017-11-27 PROCEDURE — 63600175 PHARM REV CODE 636 W HCPCS: Performed by: ANESTHESIOLOGY

## 2017-11-27 PROCEDURE — 11000001 HC ACUTE MED/SURG PRIVATE ROOM

## 2017-11-27 PROCEDURE — 59514 CESAREAN DELIVERY ONLY: CPT | Mod: 80,AT,, | Performed by: OBSTETRICS & GYNECOLOGY

## 2017-11-27 PROCEDURE — 59514 CESAREAN DELIVERY ONLY: CPT | Mod: CRNA,,, | Performed by: NURSE ANESTHETIST, CERTIFIED REGISTERED

## 2017-11-27 PROCEDURE — 36000679 HC C/S TUBAL LIGATION, UNSCHEDULED

## 2017-11-27 PROCEDURE — 58611 LIGATE OVIDUCT(S) ADD-ON: CPT | Mod: ,,, | Performed by: OBSTETRICS & GYNECOLOGY

## 2017-11-27 PROCEDURE — 63600175 PHARM REV CODE 636 W HCPCS: Performed by: OBSTETRICS & GYNECOLOGY

## 2017-11-27 PROCEDURE — 0UQ90ZZ REPAIR UTERUS, OPEN APPROACH: ICD-10-PCS | Performed by: OBSTETRICS & GYNECOLOGY

## 2017-11-27 PROCEDURE — 25000003 PHARM REV CODE 250: Performed by: ANESTHESIOLOGY

## 2017-11-27 PROCEDURE — 59514 CESAREAN DELIVERY ONLY: CPT | Mod: ANES,,, | Performed by: ANESTHESIOLOGY

## 2017-11-27 PROCEDURE — 86850 RBC ANTIBODY SCREEN: CPT

## 2017-11-27 PROCEDURE — 72100002 HC LABOR CARE, 1ST 8 HOURS

## 2017-11-27 PROCEDURE — 27200688 HC TRAY, SPINAL-HYPER/ ISOBARIC: Performed by: ANESTHESIOLOGY

## 2017-11-27 PROCEDURE — 37000008 HC ANESTHESIA 1ST 15 MINUTES: Performed by: OBSTETRICS & GYNECOLOGY

## 2017-11-27 PROCEDURE — 96360 HYDRATION IV INFUSION INIT: CPT

## 2017-11-27 PROCEDURE — 58611 LIGATE OVIDUCT(S) ADD-ON: CPT | Mod: 80,,, | Performed by: OBSTETRICS & GYNECOLOGY

## 2017-11-27 PROCEDURE — 59514 CESAREAN DELIVERY ONLY: CPT | Mod: AT,,, | Performed by: OBSTETRICS & GYNECOLOGY

## 2017-11-27 PROCEDURE — 86592 SYPHILIS TEST NON-TREP QUAL: CPT

## 2017-11-27 PROCEDURE — 37000009 HC ANESTHESIA EA ADD 15 MINS: Performed by: OBSTETRICS & GYNECOLOGY

## 2017-11-27 PROCEDURE — 86900 BLOOD TYPING SEROLOGIC ABO: CPT

## 2017-11-27 PROCEDURE — 25000003 PHARM REV CODE 250: Performed by: NURSE ANESTHETIST, CERTIFIED REGISTERED

## 2017-11-27 PROCEDURE — 85025 COMPLETE CBC W/AUTO DIFF WBC: CPT

## 2017-11-27 PROCEDURE — 86901 BLOOD TYPING SEROLOGIC RH(D): CPT

## 2017-11-27 PROCEDURE — 99211 OFF/OP EST MAY X REQ PHY/QHP: CPT

## 2017-11-27 RX ORDER — DOCUSATE SODIUM 100 MG/1
200 CAPSULE, LIQUID FILLED ORAL 2 TIMES DAILY PRN
Status: DISCONTINUED | OUTPATIENT
Start: 2017-11-27 | End: 2017-11-30 | Stop reason: HOSPADM

## 2017-11-27 RX ORDER — SODIUM CITRATE AND CITRIC ACID MONOHYDRATE 334; 500 MG/5ML; MG/5ML
30 SOLUTION ORAL ONCE
Status: COMPLETED | OUTPATIENT
Start: 2017-11-27 | End: 2017-11-27

## 2017-11-27 RX ORDER — ONDANSETRON 8 MG/1
8 TABLET, ORALLY DISINTEGRATING ORAL EVERY 8 HOURS PRN
Status: DISCONTINUED | OUTPATIENT
Start: 2017-11-27 | End: 2017-11-30 | Stop reason: HOSPADM

## 2017-11-27 RX ORDER — KETOROLAC TROMETHAMINE 30 MG/ML
30 INJECTION, SOLUTION INTRAMUSCULAR; INTRAVENOUS EVERY 6 HOURS
Status: DISCONTINUED | OUTPATIENT
Start: 2017-11-27 | End: 2017-11-28

## 2017-11-27 RX ORDER — MISOPROSTOL 200 UG/1
200 TABLET ORAL ONCE AS NEEDED
Status: ACTIVE | OUTPATIENT
Start: 2017-11-27 | End: 2017-11-27

## 2017-11-27 RX ORDER — OXYCODONE AND ACETAMINOPHEN 10; 325 MG/1; MG/1
1 TABLET ORAL EVERY 4 HOURS PRN
Status: DISCONTINUED | OUTPATIENT
Start: 2017-11-27 | End: 2017-11-30 | Stop reason: HOSPADM

## 2017-11-27 RX ORDER — ONDANSETRON 8 MG/1
8 TABLET, ORALLY DISINTEGRATING ORAL EVERY 8 HOURS PRN
Status: DISCONTINUED | OUTPATIENT
Start: 2017-11-27 | End: 2017-11-27

## 2017-11-27 RX ORDER — MISOPROSTOL 200 UG/1
200 TABLET ORAL
Status: DISCONTINUED | OUTPATIENT
Start: 2017-11-27 | End: 2017-11-27

## 2017-11-27 RX ORDER — FAMOTIDINE 10 MG/ML
20 INJECTION INTRAVENOUS ONCE
Status: COMPLETED | OUTPATIENT
Start: 2017-11-27 | End: 2017-11-27

## 2017-11-27 RX ORDER — ACETAMINOPHEN 325 MG/1
650 TABLET ORAL EVERY 6 HOURS PRN
Status: DISCONTINUED | OUTPATIENT
Start: 2017-11-27 | End: 2017-11-30 | Stop reason: HOSPADM

## 2017-11-27 RX ORDER — PHENYLEPHRINE HYDROCHLORIDE 10 MG/ML
INJECTION INTRAVENOUS
Status: DISCONTINUED | OUTPATIENT
Start: 2017-11-27 | End: 2017-11-27

## 2017-11-27 RX ORDER — OXYCODONE AND ACETAMINOPHEN 5; 325 MG/1; MG/1
1 TABLET ORAL EVERY 4 HOURS PRN
Status: DISCONTINUED | OUTPATIENT
Start: 2017-11-27 | End: 2017-11-30 | Stop reason: HOSPADM

## 2017-11-27 RX ORDER — CLINDAMYCIN PHOSPHATE 900 MG/50ML
900 INJECTION, SOLUTION INTRAVENOUS
Status: DISCONTINUED | OUTPATIENT
Start: 2017-11-27 | End: 2017-11-27

## 2017-11-27 RX ORDER — CLINDAMYCIN PHOSPHATE 150 MG/ML
900 INJECTION, SOLUTION INTRAVENOUS
Status: DISCONTINUED | OUTPATIENT
Start: 2017-11-27 | End: 2017-11-27

## 2017-11-27 RX ORDER — SIMETHICONE 80 MG
1 TABLET,CHEWABLE ORAL EVERY 6 HOURS PRN
Status: DISCONTINUED | OUTPATIENT
Start: 2017-11-27 | End: 2017-11-30 | Stop reason: HOSPADM

## 2017-11-27 RX ORDER — HYDROMORPHONE HCL IN 0.9% NACL 6 MG/30 ML
PATIENT CONTROLLED ANALGESIA SYRINGE INTRAVENOUS CONTINUOUS
Status: DISCONTINUED | OUTPATIENT
Start: 2017-11-27 | End: 2017-11-30 | Stop reason: HOSPADM

## 2017-11-27 RX ORDER — BUTORPHANOL TARTRATE 2 MG/ML
2 INJECTION INTRAMUSCULAR; INTRAVENOUS
Status: DISCONTINUED | OUTPATIENT
Start: 2017-11-27 | End: 2017-11-27

## 2017-11-27 RX ORDER — DIPHENHYDRAMINE HYDROCHLORIDE 50 MG/ML
25 INJECTION INTRAMUSCULAR; INTRAVENOUS EVERY 4 HOURS PRN
Status: DISCONTINUED | OUTPATIENT
Start: 2017-11-27 | End: 2017-11-30 | Stop reason: HOSPADM

## 2017-11-27 RX ORDER — NALOXONE HCL 0.4 MG/ML
0.02 VIAL (ML) INJECTION
Status: DISCONTINUED | OUTPATIENT
Start: 2017-11-27 | End: 2017-11-30 | Stop reason: HOSPADM

## 2017-11-27 RX ORDER — SODIUM CHLORIDE, SODIUM LACTATE, POTASSIUM CHLORIDE, CALCIUM CHLORIDE 600; 310; 30; 20 MG/100ML; MG/100ML; MG/100ML; MG/100ML
INJECTION, SOLUTION INTRAVENOUS CONTINUOUS
Status: DISCONTINUED | OUTPATIENT
Start: 2017-11-27 | End: 2017-11-30 | Stop reason: HOSPADM

## 2017-11-27 RX ORDER — METHYLERGONOVINE MALEATE 0.2 MG/ML
INJECTION INTRAVENOUS
Status: DISPENSED
Start: 2017-11-27 | End: 2017-11-28

## 2017-11-27 RX ORDER — DIPHENHYDRAMINE HCL 25 MG
25 CAPSULE ORAL EVERY 4 HOURS PRN
Status: DISCONTINUED | OUTPATIENT
Start: 2017-11-27 | End: 2017-11-30 | Stop reason: HOSPADM

## 2017-11-27 RX ORDER — SODIUM CHLORIDE, SODIUM LACTATE, POTASSIUM CHLORIDE, CALCIUM CHLORIDE 600; 310; 30; 20 MG/100ML; MG/100ML; MG/100ML; MG/100ML
INJECTION, SOLUTION INTRAVENOUS CONTINUOUS PRN
Status: DISCONTINUED | OUTPATIENT
Start: 2017-11-27 | End: 2017-11-27

## 2017-11-27 RX ORDER — IBUPROFEN 400 MG/1
800 TABLET ORAL EVERY 8 HOURS PRN
Status: DISCONTINUED | OUTPATIENT
Start: 2017-11-27 | End: 2017-11-27

## 2017-11-27 RX ORDER — FENTANYL CITRATE 50 UG/ML
INJECTION, SOLUTION INTRAMUSCULAR; INTRAVENOUS
Status: DISCONTINUED | OUTPATIENT
Start: 2017-11-27 | End: 2017-11-27

## 2017-11-27 RX ORDER — OXYTOCIN 10 [USP'U]/ML
INJECTION, SOLUTION INTRAMUSCULAR; INTRAVENOUS
Status: DISCONTINUED | OUTPATIENT
Start: 2017-11-27 | End: 2017-11-27

## 2017-11-27 RX ORDER — MUPIROCIN 20 MG/G
1 OINTMENT TOPICAL 2 TIMES DAILY
Status: DISCONTINUED | OUTPATIENT
Start: 2017-11-27 | End: 2017-11-30 | Stop reason: HOSPADM

## 2017-11-27 RX ORDER — OXYTOCIN/RINGER'S LACTATE 20/1000 ML
41.65 PLASTIC BAG, INJECTION (ML) INTRAVENOUS CONTINUOUS
Status: ACTIVE | OUTPATIENT
Start: 2017-11-27 | End: 2017-11-28

## 2017-11-27 RX ADMIN — OXYTOCIN 30 UNITS: 10 INJECTION, SOLUTION INTRAMUSCULAR; INTRAVENOUS at 05:11

## 2017-11-27 RX ADMIN — PHENYLEPHRINE HYDROCHLORIDE 200 MCG: 10 INJECTION INTRAVENOUS at 05:11

## 2017-11-27 RX ADMIN — OXYTOCIN 20 UNITS: 10 INJECTION, SOLUTION INTRAMUSCULAR; INTRAVENOUS at 06:11

## 2017-11-27 RX ADMIN — FENTANYL CITRATE 10 MCG: 50 INJECTION INTRAMUSCULAR; INTRAVENOUS at 05:11

## 2017-11-27 RX ADMIN — ONDANSETRON 8 MG: 8 TABLET, ORALLY DISINTEGRATING ORAL at 09:11

## 2017-11-27 RX ADMIN — SODIUM CITRATE AND CITRIC ACID MONOHYDRATE 30 ML: 500; 334 SOLUTION ORAL at 04:11

## 2017-11-27 RX ADMIN — CLINDAMYCIN IN 5 PERCENT DEXTROSE 900 MG: 18 INJECTION, SOLUTION INTRAVENOUS at 05:11

## 2017-11-27 RX ADMIN — MUPIROCIN 1 G: 20 OINTMENT TOPICAL at 09:11

## 2017-11-27 RX ADMIN — KETOROLAC TROMETHAMINE 30 MG: 30 INJECTION, SOLUTION INTRAMUSCULAR at 07:11

## 2017-11-27 RX ADMIN — SODIUM CHLORIDE, SODIUM LACTATE, POTASSIUM CHLORIDE, AND CALCIUM CHLORIDE: .6; .31; .03; .02 INJECTION, SOLUTION INTRAVENOUS at 06:11

## 2017-11-27 RX ADMIN — SODIUM CHLORIDE, SODIUM LACTATE, POTASSIUM CHLORIDE, AND CALCIUM CHLORIDE 1000 ML: .6; .31; .03; .02 INJECTION, SOLUTION INTRAVENOUS at 04:11

## 2017-11-27 RX ADMIN — Medication: at 07:11

## 2017-11-27 RX ADMIN — SODIUM CHLORIDE 452 MG: 900 INJECTION, SOLUTION INTRAVENOUS at 04:11

## 2017-11-27 RX ADMIN — FAMOTIDINE 20 MG: 10 INJECTION INTRAVENOUS at 04:11

## 2017-11-27 RX ADMIN — FENTANYL CITRATE 90 MCG: 50 INJECTION INTRAMUSCULAR; INTRAVENOUS at 05:11

## 2017-11-27 RX ADMIN — BUPIVACAINE HYDROCHLORIDE IN DEXTROSE 1.6 ML: 7.5 INJECTION, SOLUTION SUBARACHNOID at 05:11

## 2017-11-27 RX ADMIN — SODIUM CHLORIDE, SODIUM LACTATE, POTASSIUM CHLORIDE, AND CALCIUM CHLORIDE 1000 ML: .6; .31; .03; .02 INJECTION, SOLUTION INTRAVENOUS at 06:11

## 2017-11-27 RX ADMIN — PHENYLEPHRINE HYDROCHLORIDE 100 MCG: 10 INJECTION INTRAVENOUS at 05:11

## 2017-11-27 NOTE — HOSPITAL COURSE
Exam with mottled left lower leg.  Significant edema on both legs.  Cold and clammy.    Discussed with patient and family and with Dr Watson    1. Plan Doppler of lower extremities now to rule out DVT  2.  If no evidence of DVT, would proceed with repeat  section with tubal ligation.  It would not be safe to send her home with significant mobility issue and Fall Risks  3.  Consider Lovenox postop per [employer]'s recommendations    Repeat low transverse  section with tubal ligation using Filshie clips  Surgeon: MD Guillermo  Assistant: Jessica Monroy MD  Spinal anesthesia by NILSON Hayward MD    Viable female infant 1752 2017  Weight: 8 lb 3.9 oz (3740 g)  Apgar: 99  Significant pelvic adhesions from anterior abdominal wall to uterus and bladder  Normal tubes and ovaries    No complication  No specimen  EBL: 600 cc    Postpartum course was benign.  She is breast-feeding well.  Exam was benign with patient afebrile, vitals stable, and minimal bleeding.  Normal activities.  Patient discharged home on postpartum day #3, 2017   Discharge medications include Percocet, Motrin, prenatal vitamins, and iron supplement.  Follow-up with me next week for dressing removal.    Trenton Li MD.

## 2017-11-27 NOTE — PROGRESS NOTES
"The patient is a 28y/o  at 38 and 1 weeks gestation based on LMP agreeing with first trimester scan.    Ms. Burgess presents after feeling her leg give out from underneath her.She has had chronic leg discomfort and sciatic issues and was treated with Gabapentin until pregnancy. She has had progressive issues with varicose veins since pregnancy. She did not have these issues in the 2 prior pregnancies. She reports her leg gave out this am and she fell on her bottom. She denies abdominal trauma. She denies leakage of fluid or decreased fetal movement. She has not felt any contractions. Dr. Li asked me to see the patient to endorse his recommendation for delivery at this time due to progressive worsening varicosities and symptoms from these. Patient has been ambulating. Dr. Li did not desire a fetal ultrasound. The patient reports no other neurological signs or symptoms. The patient did not report any symptoms of Pe.    PMHx: patient reports history of heart murmur-no antibiotic prophylaxis warranted-no history of heart defect  Sciatic nerve pain    PSHx: D and C  2 prior csections at 39 weeks, wrist    Allergies: PCN, amoxicillin    Meds: PNV    Family history: None reported, no history of DVT    POBHx: patient reports at 6cm with her first pregnancy "baby and she flatlined and she woke up delivered"-no records was done in [place]-she has no cardiac symptoms and no workup-she was told to have spinals in her subsequent pregnancies and did well with her repeat csection last pregnancy    /72, P 76, temp pending    FHT 130s and reactive, no decels  Rare contraction    Ht: regular rate and rhythm, faint murmur  Lungs: CTA  Abd: gravid nontender  Left LE-prominent varicosities to midcalf, no erythema, swelling of left foot, patient reports pain all through leg when touching  Right LE-swelling slightly less pronounced than left leg, one small purplish spot at midcalf medially    Patient is able to feel " sensation on both legs, lifts the left leg much lower than right; not ambulating as in bed    A/P:    1. IUP at 38 and 1  2. Leg pain and varicosities: Recommend bilateral lower extremity dopplers at this time. If these are negative, it is reasonable to proceed with csection at this time given the worsening pain, swelling, and varicosities associated with pregnancy. However, the patient was advised of the increased risk of adverse fetal/ outcomes with earlier delivery and the increased risk of NICU stay. If the patient is negative for DVT, compression is recommended at time of csection, as is mechanical prophylaxis. Postpartum, I would recommend that the patient be on Lovenox prophylaxis with 60mg SQ Lovenox daily. CBC should be checked prior to starting this. This can be started 24 hours after her csection if her delivery was not complicated. She should continue the Lovenox during her hospital stay and may need this outpatient depending on her ambulation status. I would have a low threshold for continuing this for a brief time outpatient given her obesity as well. We discussed that csection increases the risk for DVT in and of itself. Blood thinners can increase the risk for bleeding and her risk for bleeding could be higher if she falls. If she is on Lovenox for a longer term than in the hospital, a CBC should be checked in one week to confirm no evidence of HIT. I would recommend physical therapy in house. I would also recommend a neurology consult given her prior history to see if any additional imaging or therapy is recommended. The patient is aware that if a DVT is present, we would not want to proceed with an immediate csection. The patient does not currently have symptoms of a skeletal issue with her leg but other imaging may be needed in the future. Recommend confirming normal 02 sat and monitoring for any signs/symptoms of PE.  3. History of heart murmur: We have no records. Dr. Li has been  aware of this and there have been no reported issues. She has never needed antibiotic prophylaxis for procedures. Recommend evaluation postpartum if any concerns. No concerns noted in prior pregnancy operative note.  4. Recommend checking temperature.  5. Recommend NPO status.  6. Patient is Rh negative-reportedly no abdominal trauma-will need type and screen and Rhogam as appropriate. Would given if undelivered.  7. Recommend notifying anesthesia of her questionable history with her prior csection and patient will have typical cardiac monitoring during csection.    Case discussed with patient and her company and Dr. Li.    ADDENDUM: H and P noted GBS bacteruria-patient was not laboring or having PPROM when I saw her-recommend confirming repeat urine culture is negative and notifying peds.

## 2017-11-27 NOTE — NURSING
Pt presented with c/o L leg going numb and pt lost her balance then fell backwards on her butt, pt c/o of pain in her lower back and butt and her leg is cold and numb, was able to doppler pedal pulses on both legs, pt denies lof, or vag. bleeding

## 2017-11-27 NOTE — SUBJECTIVE & OBJECTIVE
Obstetric HPI:  Patient reports None contractions, active fetal movement, No vaginal bleeding , No loss of fluid     This pregnancy has been complicated by Previous  sections and pedal edema  History of GBS bacteriuria      Obstetric History       T2      L2     SAB1   TAB0   Ectopic0   Multiple0   Live Births2       # Outcome Date GA Lbr Bhavin/2nd Weight Sex Delivery Anes PTL Lv   4 Current            3 Term 12 39w0d  3.147 kg (6 lb 15 oz) M CS-LTranv Spinal N ROLANDO   2 Term 04/20/10 40w0d  3.345 kg (7 lb 6 oz) F CS-LTranv EPI N ROLANDO   1 SAB                 Past Medical History:   Diagnosis Date    Kidney stone      Past Surgical History:   Procedure Laterality Date     SECTION      DILATION AND CURETTAGE OF UTERUS      WRIST SURGERY  left wrist    occured at age 13    WRIST SURGERY         PTA Medications   Medication Sig    PRENATAL PLUS, CALCIUM CARB, 27 mg iron- 1 mg Tab Take 1 tablet (1 each total) by mouth once daily.       Review of patient's allergies indicates:   Allergen Reactions    Amoxicillin Anaphylaxis    Penicillins Itching     Unknown reaction        Family History     None        Social History Main Topics    Smoking status: Former Smoker     Packs/day: 0.25     Types: Cigarettes    Smokeless tobacco: Never Used    Alcohol use No    Drug use: No    Sexual activity: Yes     Partners: Male     Review of Systems   Constitutional: Positive for fatigue. Negative for activity change, appetite change, fever and unexpected weight change.   Respiratory: Negative for cough, shortness of breath and wheezing.    Cardiovascular: Negative for chest pain and palpitations.   Gastrointestinal: Positive for abdominal pain and nausea. Negative for constipation and vomiting.   Endocrine: Negative for hot flashes.   Genitourinary: Positive for frequency, pelvic pain and vaginal discharge. Negative for dyspareunia, urgency, vaginal bleeding, dysmenorrhea and  postcoital bleeding.   Musculoskeletal: Positive for back pain and myalgias.        Lower leg numbness and edema.  Left worse than right   Skin:  Negative for rash.   Neurological: Positive for headaches. Negative for seizures.   Psychiatric/Behavioral: Negative for depression and sleep disturbance. The patient is not nervous/anxious.    Breast: Negative for breast mass, breast pain and nipple discharge     Objective:     Vital Signs (Most Recent):  Pulse: 70 (11/27/17 1347)  BP: 117/70 (11/27/17 1342)  SpO2: 98 % (11/27/17 1347) Vital Signs (24h Range):  Pulse:  [70-82] 70  SpO2:  [97 %-100 %] 98 %  BP: (110-130)/(67-89) 117/70     Weight: (P) 130.2 kg (287 lb)  Body mass index is 44.95 kg/m² (pended).    FHT: 150 Cat 1 (reassuring)  TOCO:  None    Physical Exam:   Constitutional: She appears well-developed and well-nourished. No distress.    HENT:   Head: Normocephalic and atraumatic.    Eyes: EOM are normal.    Neck: Normal range of motion.     Pulmonary/Chest: Effort normal. No respiratory distress.   Breasts: Non-tender, no engorgement, no masses, no retraction, no discharge. Negative for lymphadenopathy.         Abdominal: Soft. She exhibits no distension. There is no tenderness. There is no rebound and no guarding.             Musculoskeletal: Normal range of motion.   Left lower leg with significant edema and ecchymosis.  Tender to touch.       Neurological: She is alert.    Skin: Skin is warm and dry.    Psychiatric: She has a normal mood and affect.       Cervix:  Dilation:  0  Effacement:  50%  Station: -2  Presentation: Vertex     Significant Labs:  Lab Results   Component Value Date    GROUPTRH O NEG 11/27/2017    HEPBSAG Negative 05/12/2017       CBC:   Recent Labs  Lab 11/27/17  1516   WBC 9.96   RBC 3.99*   HGB 11.4*   HCT 34.3*      MCV 86   MCH 28.6   MCHC 33.2     CMP: No results for input(s): GLU, CALCIUM, ALBUMIN, PROT, NA, K, CO2, CL, BUN, CREATININE, ALKPHOS, ALT, AST, BILITOT in the  last 48 hours.  I have personallly reviewed all pertinent lab results from the last 24 hours.

## 2017-11-27 NOTE — ANESTHESIA PREPROCEDURE EVALUATION
11/27/2017  Charlene Burgess is a 27 y.o., female.    Anesthesia Evaluation     I have reviewed the Nursing Notes.      Review of Systems  Anesthesia Hx:  No problems with previous Anesthesia   Social:  Former Smoker    Cardiovascular:  Cardiovascular Normal     Pulmonary:  Pulmonary Normal    Renal/:   renal calculi    Hepatic/GI:   No Bowel Prep. Denies PUD. Denies Liver Disease. Denies Hepatitis.    Musculoskeletal:   Pt has lower extremity swelling following fall. LEUS neg for DVT; no pre-eclampsia dx   Neurological:  Neurology Normal    Endocrine:  Endocrine Normal        Physical Exam  General:  Morbid Obesity            Mental Status:  Mental Status Findings: Normal        Anesthesia Plan  Type of Anesthesia, risks & benefits discussed:  Anesthesia Type:  spinal  Patient's Preference:   Intra-op Monitoring Plan: standard ASA monitors  Intra-op Monitoring Plan Comments:   Post Op Pain Control Plan:   Post Op Pain Control Plan Comments:   Induction:    Beta Blocker:  Patient is not currently on a Beta-Blocker (No further documentation required).       Informed Consent: Patient understands risks and agrees with Anesthesia plan.  Questions answered. Anesthesia consent signed with patient.  ASA Score: 2     Day of Surgery Review of History & Physical:    H&P update referred to the surgeon.         Ready For Surgery From Anesthesia Perspective.

## 2017-11-27 NOTE — TELEPHONE ENCOUNTER
----- Message from Leah Chase sent at 11/27/2017 10:19 AM CST -----  Contact: self  Pt called stating she had a fall on this a.m. Please advise pt if she should go to ER at [phone].    Thanks-

## 2017-11-27 NOTE — H&P
"[facility]  Obstetrics  History & Physical    Patient Name: Charlene Burgess  MRN: [id number]  Admission Date: 2017  Primary Care Provider: Ranjeet Coffman III, MD    Subjective:     Principal Problem:38 weeks gestation of pregnancy    History of Present Illness:  28 yo  at 38w1d  Previous Cesareans sections  Undesired fertility    History of possible sciatica.  Was on gabapentin prior to pregnancy  Worsening weakness and pedal edema with significant varicose veins  Doppler studies of her lower legs ordered 2017.  But she was not able to get in.    Came in today after falling at home, hitting her buttock.    Felt like her legs "gave out from under"    No other complaint.  No contraction.  No bleeding.  No rupture of membranes.  Good fetal movement.    Obstetric HPI:  Patient reports None contractions, active fetal movement, No vaginal bleeding , No loss of fluid     This pregnancy has been complicated by Previous  sections and pedal edema  History of GBS bacteriuria      Obstetric History       T2      L2     SAB1   TAB0   Ectopic0   Multiple0   Live Births2       # Outcome Date GA Lbr Bhavin/2nd Weight Sex Delivery Anes PTL Lv   4 Current            3 Term 12 39w0d  3.147 kg (6 lb 15 oz) M CS-LTranv Spinal N ROLANDO   2 Term 04/20/10 40w0d  3.345 kg (7 lb 6 oz) F CS-LTranv EPI N ROLANDO   1 SAB                 Past Medical History:   Diagnosis Date    Kidney stone      Past Surgical History:   Procedure Laterality Date     SECTION      DILATION AND CURETTAGE OF UTERUS      WRIST SURGERY  left wrist    occured at age 13    WRIST SURGERY         PTA Medications   Medication Sig    PRENATAL PLUS, CALCIUM CARB, 27 mg iron- 1 mg Tab Take 1 tablet (1 each total) by mouth once daily.       Review of patient's allergies indicates:   Allergen Reactions    Amoxicillin Anaphylaxis    Penicillins Itching     Unknown reaction        Family " History     None        Social History Main Topics    Smoking status: Former Smoker     Packs/day: 0.25     Types: Cigarettes    Smokeless tobacco: Never Used    Alcohol use No    Drug use: No    Sexual activity: Yes     Partners: Male     Review of Systems   Constitutional: Positive for fatigue. Negative for activity change, appetite change, fever and unexpected weight change.   Respiratory: Negative for cough, shortness of breath and wheezing.    Cardiovascular: Negative for chest pain and palpitations.   Gastrointestinal: Positive for abdominal pain and nausea. Negative for constipation and vomiting.   Endocrine: Negative for hot flashes.   Genitourinary: Positive for frequency, pelvic pain and vaginal discharge. Negative for dyspareunia, urgency, vaginal bleeding, dysmenorrhea and postcoital bleeding.   Musculoskeletal: Positive for back pain and myalgias.        Lower leg numbness and edema.  Left worse than right   Skin:  Negative for rash.   Neurological: Positive for headaches. Negative for seizures.   Psychiatric/Behavioral: Negative for depression and sleep disturbance. The patient is not nervous/anxious.    Breast: Negative for breast mass, breast pain and nipple discharge     Objective:     Vital Signs (Most Recent):  Pulse: 70 (11/27/17 1347)  BP: 117/70 (11/27/17 1342)  SpO2: 98 % (11/27/17 1347) Vital Signs (24h Range):  Pulse:  [70-82] 70  SpO2:  [97 %-100 %] 98 %  BP: (110-130)/(67-89) 117/70     Weight: (P) 130.2 kg (287 lb)  Body mass index is 44.95 kg/m² (pended).    FHT: 150 Cat 1 (reassuring)  TOCO:  None    Physical Exam:   Constitutional: She appears well-developed and well-nourished. No distress.    HENT:   Head: Normocephalic and atraumatic.    Eyes: EOM are normal.    Neck: Normal range of motion.     Pulmonary/Chest: Effort normal. No respiratory distress.   Breasts: Non-tender, no engorgement, no masses, no retraction, no discharge. Negative for lymphadenopathy.         Abdominal:  Soft. She exhibits no distension. There is no tenderness. There is no rebound and no guarding.             Musculoskeletal: Normal range of motion.   Left lower leg with significant edema and ecchymosis.  Tender to touch.       Neurological: She is alert.    Skin: Skin is warm and dry.    Psychiatric: She has a normal mood and affect.       Cervix:  Dilation:  0  Effacement:  50%  Station: -2  Presentation: Vertex     Significant Labs:  Lab Results   Component Value Date    GROUPTRH O NEG 2017    HEPBSAG Negative 2017       CBC:   Recent Labs  Lab 17  1516   WBC 9.96   RBC 3.99*   HGB 11.4*   HCT 34.3*      MCV 86   MCH 28.6   MCHC 33.2     CMP: No results for input(s): GLU, CALCIUM, ALBUMIN, PROT, NA, K, CO2, CL, BUN, CREATININE, ALKPHOS, ALT, AST, BILITOT in the last 48 hours.  I have personallly reviewed all pertinent lab results from the last 24 hours.    Assessment/Plan:     27 y.o. female  at 38w1d for:    * 38 weeks gestation of pregnancy    Proceed with repeat  section with tubal ligation now        Leg swelling in pregnancy in third trimester    Lovenox 24 hours postop        GBS bacteriuria    Gent and Clinda given preop        Previous  section complicating pregnancy, antepartum condition or complication    Proceed with repeat  section with tubal ligation now        Rh negative state in antepartum period, third trimester    Work-up postpartum            Trenton Li MD  Obstetrics  [facility] [place]

## 2017-11-27 NOTE — TELEPHONE ENCOUNTER
Spoke to pt and she stated she fell this morning walking from her bathroom to her bedroom. She said she has been having trouble with her leg giving out and Dr. Li ordered imaging but she hasn't been able to get in touch with radiology to schedule an apt. She also mentioned she is hurting. Advised pt to go to the ER for evaluation and to also mention the imaging Dr. Li order because it is in the system. Pt voiced her understanding. kt

## 2017-11-27 NOTE — HPI
"28 yo  at 38w1d  Previous Cesareans sections  Undesired fertility    History of possible sciatica.  Was on gabapentin prior to pregnancy  Worsening weakness and pedal edema with significant varicose veins  Doppler studies of her lower legs ordered 2017.  But she was not able to get in.    Came in today after falling at home, hitting her buttock.    Felt like her legs "gave out from under"    No other complaint.  No contraction.  No bleeding.  No rupture of membranes.  Good fetal movement.  "

## 2017-11-28 PROBLEM — Z3A.38 38 WEEKS GESTATION OF PREGNANCY: Status: RESOLVED | Noted: 2017-11-27 | Resolved: 2017-11-28

## 2017-11-28 LAB — RPR SER QL: NORMAL

## 2017-11-28 PROCEDURE — 99232 SBSQ HOSP IP/OBS MODERATE 35: CPT | Mod: ,,, | Performed by: OBSTETRICS & GYNECOLOGY

## 2017-11-28 PROCEDURE — 11000001 HC ACUTE MED/SURG PRIVATE ROOM

## 2017-11-28 PROCEDURE — 63600175 PHARM REV CODE 636 W HCPCS: Performed by: OBSTETRICS & GYNECOLOGY

## 2017-11-28 PROCEDURE — 36415 COLL VENOUS BLD VENIPUNCTURE: CPT

## 2017-11-28 PROCEDURE — 25000003 PHARM REV CODE 250: Performed by: OBSTETRICS & GYNECOLOGY

## 2017-11-28 RX ORDER — IBUPROFEN 600 MG/1
600 TABLET ORAL EVERY 6 HOURS PRN
Status: DISCONTINUED | OUTPATIENT
Start: 2017-11-28 | End: 2017-11-30 | Stop reason: HOSPADM

## 2017-11-28 RX ORDER — BUPIVACAINE HYDROCHLORIDE 7.5 MG/ML
INJECTION, SOLUTION INTRASPINAL
Status: DISCONTINUED | OUTPATIENT
Start: 2017-11-27 | End: 2017-11-28

## 2017-11-28 RX ADMIN — OXYCODONE HYDROCHLORIDE AND ACETAMINOPHEN 1 TABLET: 10; 325 TABLET ORAL at 08:11

## 2017-11-28 RX ADMIN — OXYCODONE HYDROCHLORIDE AND ACETAMINOPHEN 1 TABLET: 10; 325 TABLET ORAL at 10:11

## 2017-11-28 RX ADMIN — KETOROLAC TROMETHAMINE 30 MG: 30 INJECTION, SOLUTION INTRAMUSCULAR at 05:11

## 2017-11-28 RX ADMIN — IBUPROFEN 600 MG: 600 TABLET, FILM COATED ORAL at 05:11

## 2017-11-28 RX ADMIN — OXYCODONE HYDROCHLORIDE AND ACETAMINOPHEN 1 TABLET: 10; 325 TABLET ORAL at 02:11

## 2017-11-28 RX ADMIN — IBUPROFEN 600 MG: 600 TABLET, FILM COATED ORAL at 12:11

## 2017-11-28 RX ADMIN — KETOROLAC TROMETHAMINE 30 MG: 30 INJECTION, SOLUTION INTRAMUSCULAR at 12:11

## 2017-11-28 RX ADMIN — OXYCODONE HYDROCHLORIDE AND ACETAMINOPHEN 1 TABLET: 10; 325 TABLET ORAL at 05:11

## 2017-11-28 RX ADMIN — SODIUM CHLORIDE, SODIUM LACTATE, POTASSIUM CHLORIDE, AND CALCIUM CHLORIDE: .6; .31; .03; .02 INJECTION, SOLUTION INTRAVENOUS at 12:11

## 2017-11-28 RX ADMIN — MUPIROCIN 1 G: 20 OINTMENT TOPICAL at 08:11

## 2017-11-28 RX ADMIN — IBUPROFEN 600 MG: 600 TABLET, FILM COATED ORAL at 10:11

## 2017-11-28 RX ADMIN — MUPIROCIN 1 G: 20 OINTMENT TOPICAL at 09:11

## 2017-11-28 NOTE — PLAN OF CARE
Problem: Patient Care Overview  Goal: Individualization & Mutuality  Outcome: Ongoing (interventions implemented as appropriate)  Pt. tolerating pain with prn motrin and percocet. Tolerating diet, will adv. As tolerated. Enc. Pt. To ambulate today in hallway. I&o today. poc reviewed with pt. Bonding well with infant. Vss. Breastfeeding ad winter.

## 2017-11-28 NOTE — TRANSFER OF CARE
"Anesthesia Transfer of Care Note    Patient: Charlene Burgess    Procedure(s) Performed: Procedure(s) (LRB):  DELIVERY- SECTION (Bilateral)    Patient location: Labor and Delivery    Anesthesia Type: spinal    Transport from OR: Transported from OR on room air with adequate spontaneous ventilation    Post pain: adequate analgesia    Post assessment: no apparent anesthetic complications and tolerated procedure well    Post vital signs: stable    Level of consciousness: awake, alert and oriented    Nausea/Vomiting: no nausea/vomiting    Complications: none          Last vitals:   Visit Vitals  /66   Pulse 98   Temp 36.6 °C (97.9 °F) (Oral)   Resp (!) 1   Ht 5' 7" (1.702 m)   Wt 130.2 kg (287 lb)   LMP 2017   SpO2 100%   BMI 44.95 kg/m²     "

## 2017-11-28 NOTE — SUBJECTIVE & OBJECTIVE
Hospital course: Exam with mottled left lower leg.  Significant edema on both legs.  Cold and clammy.    Discussed with patient and family and with Dr Watson    1. Plan Doppler of lower extremities now to rule out DVT  2.  If no evidence of DVT, would proceed with repeat  section with tubal ligation.  It would not be safe to send her home with significant mobility issue and Fall Risks  3.  Consider Lovenox postop per [facility]'s recommendations    Repeat low transverse  section with tubal ligation using Filshie clips  Surgeon: MD Guillermo  Assistant: Jessica Monroy MD  Spinal anesthesia by NISLON Hayward MD    Viable female infant [id number]  Weight: 8 lb 3.9 oz (3740 g)  Apgar:   Significant pelvic adhesions from anterior abdominal wall to uterus and bladder  Normal tubes and ovaries    No complication  No specimen  EBL: 600 cc    Trenton Li MD              Interval History:   Status post repeat low transverse  section with tubal ligation on 2017    She is doing well this morning. She is tolerating a regular diet without nausea or vomiting. She is voiding spontaneously. She is ambulating. She has passed flatus, and has not a BM. Vaginal bleeding is mild. She denies fever or chills. Abdominal pain is moderate and controlled with oral medications. She is breastfeeding. She desires circumcision for her male baby: not applicable.    Her pedal edema and pain is significantly better today.      Objective:     Vital Signs (Most Recent):  Temp: 96.2 °F (35.7 °C) (17)  Pulse: (!) 59 (17)  Resp: 20 (17)  BP: (!) 109/57 (17 07)  SpO2: 100 % (17 1836) Vital Signs (24h Range):  Temp:  [96.2 °F (35.7 °C)-98 °F (36.7 °C)] 96.2 °F (35.7 °C)  Pulse:  [50-98] 59  Resp:  [0-20] 20  SpO2:  [95 %-100 %] 100 %  BP: (109-144)/(57-89) 109/57     Weight: 130.2 kg (287 lb)  Body mass index is 44.95 kg/m².      Intake/Output Summary (Last 24 hours) at 17 4463  Last  data filed at 11/28/17 0759   Gross per 24 hour   Intake             1531 ml   Output             1620 ml   Net              -89 ml       Significant Labs:  Lab Results   Component Value Date    GROUPTRH O NEG 11/27/2017    HEPBSAG Negative 05/12/2017       Recent Labs  Lab 11/27/17  1516   HGB 11.4*   HCT 34.3*       CBC:   Recent Labs  Lab 11/27/17  1516   WBC 9.96   RBC 3.99*   HGB 11.4*   HCT 34.3*      MCV 86   MCH 28.6   MCHC 33.2     I have personallly reviewed all pertinent lab results from the last 24 hours.    Physical Exam:   Constitutional: She appears well-developed and well-nourished. No distress.    HENT:   Head: Normocephalic and atraumatic.    Eyes: EOM are normal.    Neck: Normal range of motion.    Cardiovascular: Normal rate.     Pulmonary/Chest: Effort normal. No respiratory distress.        Abdominal: Soft. She exhibits no distension. There is no tenderness. There is no rebound and no guarding.   Pfannenstiel incision in AquaCel dressing.  No evidence of active bleeding.                 Musculoskeletal: Normal range of motion.       Neurological: She is alert.    Skin: Skin is warm and dry.    Psychiatric: She has a normal mood and affect.

## 2017-11-28 NOTE — LACTATION NOTE
"Spoke with pt in room.  Baby asleep at this time, without signs of hunger cues. Reviewed breastfeeding basics.  Encouraged to call to call for latch check with next feeding and prn assist.  States "understand" and verbalized appropriate recall.  "

## 2017-11-28 NOTE — PROGRESS NOTES
[facility]  Obstetrics  Postpartum Progress Note    Patient Name: Charlene Burgess  MRN: [id number]  Admission Date: 2017  Hospital Length of Stay: 1 days  Attending Physician: Trenton Li MD  Primary Care Provider: Ranjeet Coffman III, MD    Subjective:     Principal Problem:Status post  section    Hospital course: Exam with mottled left lower leg.  Significant edema on both legs.  Cold and clammy.    Discussed with patient and family and with Dr Watson    1. Plan Doppler of lower extremities now to rule out DVT  2.  If no evidence of DVT, would proceed with repeat  section with tubal ligation.  It would not be safe to send her home with significant mobility issue and Fall Risks  3.  Consider Lovenox postop per [facility]'s recommendations    Repeat low transverse  section with tubal ligation using Filshie clips  Surgeon: MD Guillermo  Assistant: Jessica Monroy MD  Spinal anesthesia by NILSON Hayward MD    Viable female infant 1752 2017  Weight: 8 lb 3.9 oz (3740 g)  Apgar: 9/9  Significant pelvic adhesions from anterior abdominal wall to uterus and bladder  Normal tubes and ovaries    No complication  No specimen  EBL: 600 cc    Trenton Li MD              Interval History:   Status post repeat low transverse  section with tubal ligation on 2017    She is doing well this morning. She is tolerating a regular diet without nausea or vomiting. She is voiding spontaneously. She is ambulating. She has passed flatus, and has not a BM. Vaginal bleeding is mild. She denies fever or chills. Abdominal pain is moderate and controlled with oral medications. She is breastfeeding. She desires circumcision for her male baby: not applicable.    Her pedal edema and pain is significantly better today.      Objective:     Vital Signs (Most Recent):  Temp: 96.2 °F (35.7 °C) (17)  Pulse: (!) 59 (17)  Resp: 20 (17)  BP: (!) 109/57 (17  0738)  SpO2: 100 % (17 1836) Vital Signs (24h Range):  Temp:  [96.2 °F (35.7 °C)-98 °F (36.7 °C)] 96.2 °F (35.7 °C)  Pulse:  [50-98] 59  Resp:  [0-20] 20  SpO2:  [95 %-100 %] 100 %  BP: (109-144)/(57-89) 109/57     Weight: 130.2 kg (287 lb)  Body mass index is 44.95 kg/m².      Intake/Output Summary (Last 24 hours) at 17 0942  Last data filed at 17 0759   Gross per 24 hour   Intake             1531 ml   Output             1620 ml   Net              -89 ml       Significant Labs:  Lab Results   Component Value Date    GROUPTRH O NEG 2017    HEPBSAG Negative 2017       Recent Labs  Lab 17  1516   HGB 11.4*   HCT 34.3*       CBC:   Recent Labs  Lab 17  1516   WBC 9.96   RBC 3.99*   HGB 11.4*   HCT 34.3*      MCV 86   MCH 28.6   MCHC 33.2     I have personallly reviewed all pertinent lab results from the last 24 hours.    Physical Exam:   Constitutional: She appears well-developed and well-nourished. No distress.    HENT:   Head: Normocephalic and atraumatic.    Eyes: EOM are normal.    Neck: Normal range of motion.    Cardiovascular: Normal rate.     Pulmonary/Chest: Effort normal. No respiratory distress.        Abdominal: Soft. She exhibits no distension. There is no tenderness. There is no rebound and no guarding.   Pfannenstiel incision in AquaCel dressing.  No evidence of active bleeding.                 Musculoskeletal: Normal range of motion.       Neurological: She is alert.    Skin: Skin is warm and dry.    Psychiatric: She has a normal mood and affect.       Assessment/Plan:     27 y.o. female  for:    * Status post  section    Routine postpartum care  Watch vaginal bleeding  Pain control  Lactation assistance  Discharge home on postpartum day #3, 2017          Leg swelling in pregnancy in third trimester    Significantly better this morning.  Less swelling and pain  Color returns  Varicose improves  Ambulating well    Will hold off on  Lovenox        GBS bacteriuria    Gent and Clinda given preop  Now postpartum  Peds aware        Rh negative state in antepartum period, third trimester    Work-up postpartum            Disposition: As patient meets milestones, will plan to discharge home.    Trenton Li MD  Obstetrics  [facility] [place]

## 2017-11-28 NOTE — PLAN OF CARE
Problem: Patient Care Overview  Goal: Plan of Care Review  Outcome: Ongoing (interventions implemented as appropriate)  VSS. Bedrest.  Pain controlled with toradol and dilaudid pca.  Swann.  Breastfeeding infant.  Plan of care reviewed with pt, all questions and concerns addressed.

## 2017-11-28 NOTE — ANESTHESIA POSTPROCEDURE EVALUATION
"Anesthesia Post Evaluation    Patient: Charlene Burgess    Procedure(s) Performed: Procedure(s) (LRB):  DELIVERY- SECTION (Bilateral)    Final Anesthesia Type: spinal  Patient location during evaluation: labor & delivery  Patient participation: Yes- Able to Participate  Level of consciousness: awake and alert and oriented  Post-procedure vital signs: reviewed and stable  Pain management: adequate  Airway patency: patent  PONV status at discharge: No PONV  Anesthetic complications: no      Cardiovascular status: blood pressure returned to baseline and hemodynamically stable  Respiratory status: unassisted and spontaneous ventilation  Hydration status: euvolemic  Follow-up not needed.        Visit Vitals  /71   Pulse 72   Temp 36.7 °C (98 °F)   Resp 20   Ht 5' 7" (1.702 m)   Wt 130.2 kg (287 lb)   LMP 2017   SpO2 100%   Breastfeeding? Yes   BMI 44.95 kg/m²       Pain/Eric Score: Pain Assessment Performed: Yes (2017 12:00 AM)  Presence of Pain: complains of pain/discomfort (2017 12:00 AM)  Pain Rating Prior to Med Admin: 6 (2017 12:28 AM)  Pain Rating Post Med Admin: 6 (2017  7:47 PM)      "

## 2017-11-28 NOTE — ASSESSMENT & PLAN NOTE
Routine postpartum care  Watch vaginal bleeding  Pain control  Lactation assistance  Discharge home on postpartum day #3, 11/30/2017

## 2017-11-28 NOTE — L&D DELIVERY NOTE
[facility]   Section   Operative Note    SUMMARY     Date of Procedure: 2017        PREOPERATIVE DIAGNOSES:  1.  Intrauterine pregnancy at 38w1d.  2.  Previous  section.  3.  Undesired fertility  4.  Severe pedal edema and pain     POSTOPERATIVE DIAGNOSES:  1.  Intrauterine pregnancy at 38w1d.  2.  Previous  section.  3.  Undesired fertility  4.  Severe pedal edema and pain  5.  Significant pelvic adhesions     PROCEDURE:  Repeat low transverse  section with tubal ligation using Filshie clips.     SURGEON:  Trenton Li M.D.     ASSISTANT:  Jessica Monroy M.D.     ANESTHESIA:  Spinal by Dr. NILSON Hayward     BLOOD LOSS:  About 600 mL.     URINE:  Clear.     FINDINGS:  Viable female infant delivered from vertex position at 1752 on 2017.  Weight is pending.  Apgar was 9 at 1 minute and 9 at 5 minutes.     OTHER FINDINGS:   1.  Normal tubes and ovaries bilaterally.  2.  Significant pelvic adhesions from anterior abdominal wall to uterus and bladder     COMPLICATIONS:  None.     SPECIMEN:  None.     HISTORY:  The patient is a 27year-old  at 38w1d consistent with early ultrasound, previous  sections with undesired fertility.    History of significant pedal edema and pain with significant varicose veins.  Status post fall this morning.  Risks and benefits discussed.    No complication  No specimen  EBL: 600 cc     PROCEDURE IN DETAIL:  After confirming appropriate consent was signed in chart, the patient was then transported to the OR.  Spinal anesthesia per Anesthesia.  The patient was then put in a supine position, prepped and draped.  Adequate anesthesia was verified with negative Allis test to the umbilicus.  A Pfannenstiel skin incision was then made with the scalpel, extended down to the fascia.  Fascia was then entered sharply and extended bilaterally sharply.  Peritoneum was then identified and entered bluntly and sharply with difficulty secondary  to significant adhesions from anterior abdominal wall to uterus and bladder.  The lower uterine segment was then identified after extensive lysis of adhesions.  Thin lower uterine segment noted.  Hysterotomy was performed using the scalpel and amniotomy performed bluntly without any difficulty.  Clear fluid noted.  The uterine incision was then extended bluntly using the surgeon's finger.  A viable female infant delivered from vertex position without any difficulty, suctioned on the field and handed off to the nurse practitioner in attendance.  Birth time was 1752. on 2017.  Weight is pending.  Apgar is 9 at 1 minute and 9 at 5 minutes.  Cord blood was then obtained.  Placenta was then manually extracted intact.  Uterus was then delivered into the abdominal incision.  Endometrial cavity was then wiped clean with wet laps.  Left uterine artery laceration noted.  Uterine incision was then repaired in 2 layers using #0 Monocryl in a running interlocking fashion with the second layer in an imbricating manner.  Good hemostasis was noted.  Again, normal tubes and ovaries bilaterally.    Filshie clip placed at the junction of proximal and middle third of bilateral tubes without any difficulty.  Uterus was then replaced back to the abdomen.  Good hemostasis was noted.  Peritoneum was then closed using #3-0 Vicryl, fascia was then closed using #1 Vicryl in a running nonlocking fashion.  Subcutaneous tissue was then noted to be in good hemostasis.  It was then reapproximated using #3-0 Vicryl and then the skin was then reapproximated using InSorb absorbable staples and a pressure bandage applied with the Aquacel dressing.  The patient was then transferred to the Recovery Room in stable condition.           Specimens:   Specimen (12h ago through future)    None          Condition: Good    Disposition: PACU - hemodynamically stable.    Attestation: Good         Delivery Information for  Sg Morrison  information:  YOB: 2017   Time of birth: 5:52 PM   Sex: female   Head Delivery Date/Time: 2017  5:52 PM   Delivery type: , Low Transverse   Gestational Age: 38w1d    Delivery Providers    Delivering clinician:  Trenton Li MD   Other personnel:   Provider Role   MD Gilma Kelly, SHERRON aSinz, MD Areli Mahoney, NP    Valery Waters RN                    [place] Assessment    Living status:  Living  Apgars:     1 Minute:   5 Minute:   10 Minute:   15 Minute:   20 Minute:     Skin Color:   1  1       Heart Rate:   2  2       Reflex Irritability:   2  2       Muscle Tone:   2  2       Respiratory Effort:   2  2       Total:   9  9                      Assisted Delivery Details:    Forceps attempted?:  No  Vacuum extractor attempted?:  No         Shoulder Dystocia    Shoulder dystocia present?:  No           Presentation and Position    Presentation:   Vertex   Position:                   Interventions/Resuscitation    Method:  Bulb Suctioning, Tactile Stimulation       Cord    Vessels:  3 vessels  Complications:  None  Delayed Cord Clamping?:  Yes  Cord Clamped Date/Time:  2017  5:52 PM  Cord Blood Disposition:  Sent with Baby  Gases Sent?:  No  Stem Cell Collection (by MD):  No       Placenta    Date and time:  2017  5:52 PM  Removal:  Manual removal  Appearance:  Intact  Placenta disposition:  discarded           Labor Events:       labor: No     Labor Onset Date/Time:         Dilation Complete Date/Time:         Start Pushing Date/Time:       Rupture Date/Time:              Rupture type:           Fluid Amount:        Fluid Color:        Fluid Odor:        Membrane Status (PeriCalm):        Rupture Date/Time (PeriCalm):        Fluid Amount (PeriCalm):        Fluid Color (PeriCalm):         steroids:       Antibiotics given for GBS: No     Induction: none     Indications for  induction:        Augmentation:       Indications for augmentation:       Labor complications: None     Additional complications:          Cervical ripening:                     Delivery:      Episiotomy: None     Indication for Episiotomy:       Perineal Lacerations: None Repaired:      Periurethral Laceration: none Repaired:     Labial Laceration: none Repaired:     Sulcus Laceration: none Repaired:     Vaginal Laceration: No Repaired:     Cervical Laceration: No Repaired:     Repair suture: None     Repair # of packets:       Vaginal delivery QBL (mL): 0      QBL (mL): 0     Combined Blood Loss (mL): 0     Vaginal Sweep Performed: Yes     Surgicount Correct: Yes       Other providers:       Anesthesia    Method:  Spinal          Details (if applicable):  Trial of Labor No    Categorization: Repeat    Priority: Routine   Indications for : Repeat Section   Incision Type: low transverse     Additional  information:  Forceps:    Vacuum:    Breech:    Observed anomalies    Other (Comments):

## 2017-11-28 NOTE — PROGRESS NOTES
Pt. States that her left leg is numb from the knee down, pre-existing prior to admission. md aware.   Pt. Ambulated to bathroom well with assist from cna. Enc. Pt. To call when needing to get up oob for stand by assist.

## 2017-11-28 NOTE — PHYSICIAN QUERY
PT Name: Charlene Burgess  MR #: [id number]     Physician Query Form - Documentation Clarification      CDS/: AUTUMN Jane,RNC-[email or web address]        Contact information:[email or web address]    This form is a permanent document in the medical record.     Query Date: 2017    By submitting this query, we are merely seeking further clarification of documentation. Please utilize your independent clinical judgment when addressing the question(s) below.    The Medical record reflects the following:    Supporting Clinical Findings Location in Medical Record   GBS bacteriuria  Gent and Clinda given preop    H and P noted GBS bacteruria-patient was not laboring or having PPROM when I saw her-recommend confirming repeat urine culture is negative and notifying peds    PROCEDURE:  Repeat low transverse  section with tubal ligation using Filshie clips H&P       OB Progress note @639pm          L&D Delivery note                                                                                       Doctor, Please specify diagnosis or diagnoses associated with above clinical findings.    Provider Use Only      [ x ] Group beta strep carrier only  [  ] Group beta strep infection, please specify infection type:_______________________________________  [  ] Other, please specify:________________________                                                                                                                   [  ] Clinically undetermined

## 2017-11-28 NOTE — ANESTHESIA PROCEDURE NOTES
Spinal    Diagnosis: intrauterine pregnancy  Patient location during procedure: OR  Start time: 11/27/2017 5:25 PM  Timeout: 11/27/2017 5:25 PM  End time: 11/27/2017 5:32 PM  Staffing  Anesthesiologist: SHIV WHITTAKER  Performed: anesthesiologist   Preanesthetic Checklist  Completed: patient identified, surgical consent, pre-op evaluation, timeout performed, IV checked, risks and benefits discussed and monitors and equipment checked  Spinal Block  Patient position: sitting  Prep: ChloraPrep  Patient monitoring: heart rate, cardiac monitor and continuous pulse ox  Approach: midline  Location: L4-5  Injection technique: single shot  CSF Fluid: clear free-flowing CSF  Needle  Needle type: pencil-tip   Needle gauge: 22 G  Needle length: 5 in  Additional Documentation: negative aspiration for heme and no paresthesia on injection  Needle localization: anatomical landmarks  Assessment  Ease of block: easy  Patient's tolerance of the procedure: comfortable throughout block and no complaints  Medications:  Bolus administered: 1.6 mL of 0.75 and with dextrose bupivacaine  Epinephrine added: none  Opioid administered: 10 mcg of   fentanyl

## 2017-11-28 NOTE — ASSESSMENT & PLAN NOTE
Significantly better this morning.  Less swelling and pain  Color returns  Varicose improves  Ambulating well    Will hold off on Lovenox

## 2017-11-29 PROCEDURE — 11000001 HC ACUTE MED/SURG PRIVATE ROOM

## 2017-11-29 PROCEDURE — 99232 SBSQ HOSP IP/OBS MODERATE 35: CPT | Mod: ,,, | Performed by: OBSTETRICS & GYNECOLOGY

## 2017-11-29 PROCEDURE — 25000003 PHARM REV CODE 250: Performed by: OBSTETRICS & GYNECOLOGY

## 2017-11-29 RX ORDER — ADHESIVE BANDAGE
30 BANDAGE TOPICAL DAILY PRN
Status: DISCONTINUED | OUTPATIENT
Start: 2017-11-29 | End: 2017-11-30 | Stop reason: HOSPADM

## 2017-11-29 RX ADMIN — IBUPROFEN 600 MG: 600 TABLET, FILM COATED ORAL at 06:11

## 2017-11-29 RX ADMIN — SIMETHICONE CHEW TAB 80 MG 80 MG: 80 TABLET ORAL at 08:11

## 2017-11-29 RX ADMIN — MAGNESIUM HYDROXIDE 2400 MG: 400 SUSPENSION ORAL at 05:11

## 2017-11-29 RX ADMIN — OXYCODONE HYDROCHLORIDE AND ACETAMINOPHEN 1 TABLET: 10; 325 TABLET ORAL at 08:11

## 2017-11-29 RX ADMIN — OXYCODONE HYDROCHLORIDE AND ACETAMINOPHEN 1 TABLET: 10; 325 TABLET ORAL at 10:11

## 2017-11-29 RX ADMIN — SIMETHICONE CHEW TAB 80 MG 80 MG: 80 TABLET ORAL at 07:11

## 2017-11-29 RX ADMIN — OXYCODONE HYDROCHLORIDE AND ACETAMINOPHEN 1 TABLET: 10; 325 TABLET ORAL at 04:11

## 2017-11-29 RX ADMIN — OXYCODONE HYDROCHLORIDE AND ACETAMINOPHEN 1 TABLET: 10; 325 TABLET ORAL at 03:11

## 2017-11-29 RX ADMIN — IBUPROFEN 600 MG: 600 TABLET, FILM COATED ORAL at 07:11

## 2017-11-29 RX ADMIN — DOCUSATE SODIUM 200 MG: 100 CAPSULE, LIQUID FILLED ORAL at 07:11

## 2017-11-29 RX ADMIN — MUPIROCIN 1 G: 20 OINTMENT TOPICAL at 08:11

## 2017-11-29 NOTE — PROGRESS NOTES
[facility]  Obstetrics  Postpartum Progress Note    Patient Name: Charlene Burgess  MRN: [id number]  Admission Date: 2017  Hospital Length of Stay: 2 days  Attending Physician: Trenton Li MD  Primary Care Provider: Ranjeet Coffman III, MD    Subjective:     Principal Problem:Status post  section    Hospital course: Exam with mottled left lower leg.  Significant edema on both legs.  Cold and clammy.    Discussed with patient and family and with Dr Watson    1. Plan Doppler of lower extremities now to rule out DVT  2.  If no evidence of DVT, would proceed with repeat  section with tubal ligation.  It would not be safe to send her home with significant mobility issue and Fall Risks  3.  Consider Lovenox postop per [facility]'s recommendations    Repeat low transverse  section with tubal ligation using Filshie clips  Surgeon: MD Guillermo  Assistant: Jessica Monroy MD  Spinal anesthesia by NILSON Hayward MD    Viable female infant 1752 2017  Weight: 8 lb 3.9 oz (3740 g)  Apgar: 9/9  Significant pelvic adhesions from anterior abdominal wall to uterus and bladder  Normal tubes and ovaries    No complication  No specimen  EBL: 600 cc    Trenton iL MD              Interval History:   Status post repeat  section with tubal ligation on 2017    She is doing well this morning. She is tolerating a regular diet without nausea or vomiting. She is voiding spontaneously. She is ambulating. She has passed flatus, and has a BM. Vaginal bleeding is mild. She denies fever or chills. Abdominal pain is moderate and controlled with oral medications. She is breastfeeding. She desires circumcision for her male baby: not applicable.    Objective:     Vital Signs (Most Recent):  Temp: 97 °F (36.1 °C) (17 08)  Pulse: 75 (17 08)  Resp: 20 (17)  BP: 131/63 (17)  SpO2: 100 % (17 1836) Vital Signs (24h Range):  Temp:  [97 °F (36.1 °C)-98.1 °F (36.7  °C)] 97 °F (36.1 °C)  Pulse:  [67-88] 75  Resp:  [18-20] 20  BP: (115-131)/(54-66) 131/63     Weight: 130.2 kg (287 lb)  Body mass index is 44.95 kg/m².      Intake/Output Summary (Last 24 hours) at 17 0827  Last data filed at 17 0600   Gross per 24 hour   Intake             3000 ml   Output             1350 ml   Net             1650 ml       Significant Labs:  Lab Results   Component Value Date    GROUPTRH O NEG 2017    HEPBSAG Negative 2017       Recent Labs  Lab 17  1516   HGB 11.4*   HCT 34.3*       CBC:   Recent Labs  Lab 17  1516   WBC 9.96   RBC 3.99*   HGB 11.4*   HCT 34.3*      MCV 86   MCH 28.6   MCHC 33.2     I have personallly reviewed all pertinent lab results from the last 24 hours.    Physical Exam:   Constitutional: She appears well-developed and well-nourished. No distress.    HENT:   Head: Normocephalic and atraumatic.    Eyes: EOM are normal.    Neck: Normal range of motion.    Cardiovascular: Normal rate.     Pulmonary/Chest: Effort normal. No respiratory distress.        Abdominal: Soft. She exhibits no distension. There is no tenderness. There is no rebound and no guarding.   Pfannenstiel incision in AquaCel dressing.  No evidence of active bleeding.               Musculoskeletal: Normal range of motion.       Neurological: She is alert.    Skin: Skin is warm and dry.    Psychiatric: She has a normal mood and affect.       Assessment/Plan:     27 y.o. female  for:    * Status post  section    Routine postpartum care  Watch vaginal bleeding  Pain control  Lactation assistance  Discharge home on postpartum day #3, 2017          Leg swelling in pregnancy in third trimester    Significantly better this morning.  Less swelling and pain  Color returns  Varicose improves  Ambulating well    Will hold off on Lovenox        GBS bacteriuria    Gent and Clinda given preop  Now postpartum  Peds aware        Rh negative state in antepartum  period, third trimester    Work-up postpartum            Disposition: As patient meets milestones, will plan to discharge home.    Trenton Li MD  Obstetrics  [facility]

## 2017-11-29 NOTE — SUBJECTIVE & OBJECTIVE
Hospital course: Exam with mottled left lower leg.  Significant edema on both legs.  Cold and clammy.    Discussed with patient and family and with Dr Watson    1. Plan Doppler of lower extremities now to rule out DVT  2.  If no evidence of DVT, would proceed with repeat  section with tubal ligation.  It would not be safe to send her home with significant mobility issue and Fall Risks  3.  Consider Lovenox postop per [facility]'s recommendations    Repeat low transverse  section with tubal ligation using Filshie clips  Surgeon: MD Guillermo  Assistant: Jessica Monroy MD  Spinal anesthesia by NILSON Hayward MD    Viable female infant [id number]  Weight: 8 lb 3.9 oz (3740 g)  Apgar:   Significant pelvic adhesions from anterior abdominal wall to uterus and bladder  Normal tubes and ovaries    No complication  No specimen  EBL: 600 cc    Trenton Li MD              Interval History:   Status post repeat  section with tubal ligation on 2017    She is doing well this morning. She is tolerating a regular diet without nausea or vomiting. She is voiding spontaneously. She is ambulating. She has passed flatus, and has a BM. Vaginal bleeding is mild. She denies fever or chills. Abdominal pain is moderate and controlled with oral medications. She is breastfeeding. She desires circumcision for her male baby: not applicable.    Objective:     Vital Signs (Most Recent):  Temp: 97 °F (36.1 °C) (17 08)  Pulse: 75 (17 08)  Resp: 20 (17 08)  BP: 131/63 (17 08)  SpO2: 100 % (17 1836) Vital Signs (24h Range):  Temp:  [97 °F (36.1 °C)-98.1 °F (36.7 °C)] 97 °F (36.1 °C)  Pulse:  [67-88] 75  Resp:  [18-20] 20  BP: (115-131)/(54-66) 131/63     Weight: 130.2 kg (287 lb)  Body mass index is 44.95 kg/m².      Intake/Output Summary (Last 24 hours) at 17 0827  Last data filed at 17 0600   Gross per 24 hour   Intake             3000 ml   Output             1350 ml   Net              1650 ml       Significant Labs:  Lab Results   Component Value Date    GROUPTRH O NEG 11/27/2017    HEPBSAG Negative 05/12/2017       Recent Labs  Lab 11/27/17  1516   HGB 11.4*   HCT 34.3*       CBC:   Recent Labs  Lab 11/27/17  1516   WBC 9.96   RBC 3.99*   HGB 11.4*   HCT 34.3*      MCV 86   MCH 28.6   MCHC 33.2     I have personallly reviewed all pertinent lab results from the last 24 hours.    Physical Exam:   Constitutional: She appears well-developed and well-nourished. No distress.    HENT:   Head: Normocephalic and atraumatic.    Eyes: EOM are normal.    Neck: Normal range of motion.    Cardiovascular: Normal rate.     Pulmonary/Chest: Effort normal. No respiratory distress.        Abdominal: Soft. She exhibits no distension. There is no tenderness. There is no rebound and no guarding.   Pfannenstiel incision in AquaCel dressing.  No evidence of active bleeding.               Musculoskeletal: Normal range of motion.       Neurological: She is alert.    Skin: Skin is warm and dry.    Psychiatric: She has a normal mood and affect.

## 2017-11-29 NOTE — PLAN OF CARE
Problem: Breastfeeding (Adult,Obstetrics,Pediatric)  Goal: Signs and Symptoms of Listed Potential Problems Will be Absent, Minimized or Managed (Breastfeeding)  Signs and symptoms of listed potential problems will be absent, minimized or managed by discharge/transition of care (reference Breastfeeding (Adult,Obstetrics,Pediatric) CPG).   Outcome: Ongoing (interventions implemented as appropriate)  Plan breastfeed on demand at least 8 times in 24 hours

## 2017-11-29 NOTE — LACTATION NOTE
This note was copied from a baby's chart.  Mother breastfeeding independently -states strong sucking with swallows noted -complaint of slight nipple tenderness on left side -given lanolin and instructed in use and encouraged to call for assistance on tht side

## 2017-11-29 NOTE — PLAN OF CARE
Problem: Patient Care Overview  Goal: Plan of Care Review  Outcome: Ongoing (interventions implemented as appropriate)  Plan of care reviewed with pt. Pt active in all aspects of care.  All questions and concerns addressed.  Pt maintaining adequate pain control with PO pain meds.  Mother and infant bonding well.breastfeeding without complication.  Pt had a fall prior to delivery (at home), and has some bruising and swelling to the L lower leg.  Neg homans, otherwise exam WNL.

## 2017-11-30 VITALS
DIASTOLIC BLOOD PRESSURE: 56 MMHG | WEIGHT: 287 LBS | TEMPERATURE: 97 F | SYSTOLIC BLOOD PRESSURE: 107 MMHG | HEIGHT: 67 IN | BODY MASS INDEX: 45.04 KG/M2 | RESPIRATION RATE: 18 BRPM | HEART RATE: 61 BPM | OXYGEN SATURATION: 100 %

## 2017-11-30 PROBLEM — O26.893 RH NEGATIVE STATE IN ANTEPARTUM PERIOD, THIRD TRIMESTER: Status: RESOLVED | Noted: 2017-04-27 | Resolved: 2017-11-30

## 2017-11-30 PROBLEM — Z67.91 RH NEGATIVE STATE IN ANTEPARTUM PERIOD, THIRD TRIMESTER: Status: RESOLVED | Noted: 2017-04-27 | Resolved: 2017-11-30

## 2017-11-30 PROCEDURE — 99238 HOSP IP/OBS DSCHRG MGMT 30/<: CPT | Mod: ,,, | Performed by: OBSTETRICS & GYNECOLOGY

## 2017-11-30 PROCEDURE — 25000003 PHARM REV CODE 250: Performed by: OBSTETRICS & GYNECOLOGY

## 2017-11-30 RX ORDER — OXYCODONE AND ACETAMINOPHEN 5; 325 MG/1; MG/1
1 TABLET ORAL EVERY 4 HOURS PRN
Qty: 30 TABLET | Refills: 0 | Status: SHIPPED | OUTPATIENT
Start: 2017-11-30 | End: 2018-01-12

## 2017-11-30 RX ORDER — IBUPROFEN 600 MG/1
600 TABLET ORAL EVERY 6 HOURS PRN
Qty: 40 TABLET | Refills: 1 | Status: SHIPPED | OUTPATIENT
Start: 2017-11-30 | End: 2018-07-19

## 2017-11-30 RX ADMIN — OXYCODONE HYDROCHLORIDE AND ACETAMINOPHEN 1 TABLET: 10; 325 TABLET ORAL at 09:11

## 2017-11-30 RX ADMIN — IBUPROFEN 600 MG: 600 TABLET, FILM COATED ORAL at 12:11

## 2017-11-30 RX ADMIN — MUPIROCIN 1 G: 20 OINTMENT TOPICAL at 09:11

## 2017-11-30 RX ADMIN — OXYCODONE HYDROCHLORIDE AND ACETAMINOPHEN 1 TABLET: 10; 325 TABLET ORAL at 05:11

## 2017-11-30 RX ADMIN — IBUPROFEN 600 MG: 600 TABLET, FILM COATED ORAL at 09:11

## 2017-11-30 RX ADMIN — DOCUSATE SODIUM 200 MG: 100 CAPSULE, LIQUID FILLED ORAL at 09:11

## 2017-11-30 RX ADMIN — OXYCODONE HYDROCHLORIDE AND ACETAMINOPHEN 1 TABLET: 10; 325 TABLET ORAL at 12:11

## 2017-11-30 RX ADMIN — SIMETHICONE CHEW TAB 80 MG 80 MG: 80 TABLET ORAL at 05:11

## 2017-11-30 NOTE — PLAN OF CARE
Problem: Fall Risk,  (Adult,Obstetrics,Pediatric)  Goal: Identify Related Risk Factors and Signs and Symptoms  Related risk factors and signs and symptoms are identified upon initiation of Human Response Clinical Practice Guideline (CPG)   Outcome: Ongoing (interventions implemented as appropriate)  Ambulating without difficulty and in hallway. Steady gait. Encouraged to call if assistance needed. Understanding voiced.

## 2017-11-30 NOTE — PLAN OF CARE
Problem: Fall Risk,  (Adult,Obstetrics,Pediatric)  Intervention: Prevent [place] Drop/Fall  Infant fall prevention reviewed with mother. Mother verbalized understanding with no questions. Reviewed safety prevention facts and interventions which include the following:    *While you and your baby are in the hospital, please remember these important safety tips to keep your baby safe.     -If you are feeling weak, faint, or unsteady on your feet, DO NOT life your baby. Press the nurse call button and ask for help.  -Keep your bed in the LOWEST position (closest to the floor) at all times.  -Do NOT sleep with your baby in your bed, sofa, or chair as this may place your baby at risk of serious injury.  -When you want to sleep, first place the baby in the bassinet.  -If we find you asleep with the baby in your arms, we will move your baby to the bassinet.    *Keep your baby safe.

## 2017-11-30 NOTE — PLAN OF CARE
Problem: Postpartum ( Delivery) (Adult,Obstetrics,Pediatric)  Goal: Signs and Symptoms of Listed Potential Problems Will be Absent, Minimized or Managed (Postpartum)  Signs and symptoms of listed potential problems will be absent, minimized or managed by discharge/transition of care (reference Postpartum ( Delivery) (Adult,Obstetrics,Pediatric) CPG).   Outcome: Ongoing (interventions implemented as appropriate)  Ambulating and voiding without difficulty. Good pain control.

## 2017-11-30 NOTE — DISCHARGE INSTRUCTIONS
Breastfeeding Discharge Instructions     AAP recommends exclusive breastfeeding for the first 6 months of life and continued breastfeeding with the introduction of supplemental foods beyond the first year of life.  Recommends to delay all bottle and pacifier use until after 4 weeks of age and breastfeeding is well established.  Discussed the benefits of exclusive breastfeeding for both mother and baby.  Discussed the risks of supplementation/pacifier use on the exclusivity of breastfeeding in the first 6 months. Feed the baby at the earliest sign of hunger or comfort  o Hands to mouth, sucking motions  o Rooting or searching for something to suck on  o Dont wait for crying - it is a not a late sign of hunger; it is a sign of distress     The feedings may be 8-12 times per 24hrs and will not follow a schedule   Alternate the breast you start the feeding with, or start with the breast that feels the fullest   Switch breasts when the baby takes himself off the breast or falls asleep   Keep offering breasts until the baby looks full, no longer gives hunger signs, and stays asleep when placed on his back in the crib   If the baby is sleepy and wont wake for a feeding, put the baby skin-to-skin dressed in a diaper against the mothers bare chest   Sleep near your baby   The baby should be positioned and latched on to the breast correctly  o Chest-to-chest, chin in the breast  o Babys lips are flipped outward  o Babys mouth is stretched open wide like a shout  o Babys sucking should feel like tugging to the mother  - The baby should be drinking at the breast:  o You should hear swallowing or gulping throughout the feeding  o You should see milk on the babys lips when he comes off the breast  o Your breasts should be softer when the baby is finished feeding  o The baby should look relaxed at the end of feedings  o After the 4th day and your milk is in:  o The babys poop should turn bright yellow and be  loose, watery, and seedy  o The baby should have at least 3-4 poops the size of the palm of your hand per day  o The baby should have at least 6-8 wet diapers per day  o The urine should be light yellow in color  You should drink when you are thirsty and eat a healthy diet when you are    hungry.     Take naps to get the rest you need.   Take medications and/or drink alcohol only with permission of your obstetrician    or the babys pediatrician.  You can also call the [facility],   ([phone]), Monday-Friday, 8am-5pm Central time, to get the most   up-to-date evidence-based information on the use of medications during   pregnancy and breastfeeding.      The baby should be examined by a pediatrician at 3-5 days of age; unless ordered sooner by the pediatrician.  Once your milk comes in, the baby should be back to birth weight no later than 10-14 days of age.    For questions or concerns, Please contact Lactation Services at [phone]

## 2017-11-30 NOTE — ASSESSMENT & PLAN NOTE
Significantly improved postpartum.  Less swelling and pain  Color returns  Varicose improves  Ambulating well    Will hold off on Lovenox

## 2017-11-30 NOTE — PLAN OF CARE
"Problem: Patient Care Overview  Goal: Plan of Care Review  Outcome: Outcome(s) achieved Date Met: 11/30/17  Patient ambulating without difficulty. States "going home today.'      "

## 2017-11-30 NOTE — PLAN OF CARE
Problem: Patient Care Overview  Goal: Individualization & Mutuality  Outcome: Ongoing (interventions implemented as appropriate)  VSS. Ambulating and voiding. Good pain control with PRN medication. Passing flatus. LTV incision with aquacel dressing c/d/i. Tolerating regular diet. Breastfeeding independently and without issue. POC discussed with patient and understanding voiced. AIMEE

## 2017-11-30 NOTE — PROGRESS NOTES
Bedside handside with PATIENCE Santos RN. Patient denies any pain at this time. States she just  the baby and would like to sleep. Asked the patient if she would like to sleep before assessment and she confirmed. Instructed to call nurse when awake. White board updated with Spectralink. Significant other at bedside. Infant in crib asleep. AIMEE.

## 2017-11-30 NOTE — DISCHARGE SUMMARY
"[facility] [place]  Obstetrics  Discharge Summary      Patient Name: Charlene Burgess  MRN: [id number]  Admission Date: 2017  Hospital Length of Stay: 3 days  Discharge Date and Time:  2017 8:09 AM  Attending Physician: Trenton Li MD   Discharging Provider: Trenton Li MD  Primary Care Provider: Ranjeet Coffman III, MD    HPI: 26 yo  at 38w1d  Previous Cesareans sections  Undesired fertility    History of possible sciatica.  Was on gabapentin prior to pregnancy  Worsening weakness and pedal edema with significant varicose veins  Doppler studies of her lower legs ordered 2017.  But she was not able to get in.    Came in today after falling at home, hitting her buttock.    Felt like her legs "gave out from under"    No other complaint.  No contraction.  No bleeding.  No rupture of membranes.  Good fetal movement.    Procedure(s) (LRB):  DELIVERY- SECTION (Bilateral)     Hospital Course:   Exam with mottled left lower leg.  Significant edema on both legs.  Cold and clammy.    Discussed with patient and family and with Dr Watson    1. Plan Doppler of lower extremities now to rule out DVT  2.  If no evidence of DVT, would proceed with repeat  section with tubal ligation.  It would not be safe to send her home with significant mobility issue and Fall Risks  3.  Consider Lovenox postop per [facility]'s recommendations    Repeat low transverse  section with tubal ligation using Filshie clips  Surgeon: MD Guillermo  Assistant: Jessica Monroy MD  Spinal anesthesia by NILSON Hayward MD    Viable female infant 1752 2017  Weight: 8 lb 3.9 oz (3740 g)  Apgar: 9/9  Significant pelvic adhesions from anterior abdominal wall to uterus and bladder  Normal tubes and ovaries    No complication  No specimen  EBL: 600 cc    Postpartum course was benign.  She is breast-feeding well.  Exam was benign with patient afebrile, vitals stable, and minimal bleeding.  Normal " activities.  Patient discharged home on postpartum day #3, 2017   Discharge medications include Percocet, Motrin, prenatal vitamins, and iron supplement.  Follow-up with me next week for dressing removal.    Trenton Waite MD.              Consults         Status Ordering Provider     Inpatient consult to Anesthesiology  Once     Provider:  Javier Hayward MD    Acknowledged TRENTON WAITE     Inpatient consult to Lactation  Once     Provider:  (Not yet assigned)    Completed TRENTON WAITE          Final Active Diagnoses:    Diagnosis Date Noted POA    PRINCIPAL PROBLEM:  Status post  section [Z98.891] 2017 Yes    Varicose veins of both legs with edema [I83.893]  Yes    GBS bacteriuria [R82.71] 2017 Yes      Problems Resolved During this Admission:    Diagnosis Date Noted Date Resolved POA    Pregnancy [Z34.90] 2017 Not Applicable    38 weeks gestation of pregnancy [Z3A.38] 2017 Not Applicable    Rh negative state in antepartum period, third trimester [O09.893] 2017 Not Applicable        Labs: CBC No results for input(s): WBC, HGB, HCT, PLT in the last 48 hours. and All labs within the past 24 hours have been reviewed    Feeding Method: breast    Immunizations     Date Immunization Status Dose Route/Site Given by    17 1850 Rho (D) Immune Globulin - IM Incomplete 300 mcg Intramuscular/     17 MMR Incomplete 0.5 mL Subcutaneous/Left deltoid     17 Tdap Incomplete 0.5 mL Intramuscular/Left deltoid           Delivery:    Episiotomy: None   Lacerations: None   Repair suture: None   Repair # of packets:     Blood loss (ml): 0     Birth information:  YOB: 2017   Time of birth: 5:52 PM   Sex: female   Delivery type: , Low Transverse   Gestational Age: 38w1d    Delivery Clinician:      Other providers:       Additional  information:  Forceps:    Vacuum:    Breech:    Observed anomalies       Living?:           APGARS  One minute Five minutes Ten minutes   Skin color:         Heart rate:         Grimace:         Muscle tone:         Breathing:         Totals: 9  9        Placenta: Delivered:       appearance    Pending Diagnostic Studies:     None          Discharged Condition: good    Disposition: Home or Self Care    Follow Up:  Follow-up Information     Trenton Li MD In 1 week.    Specialties:  Obstetrics and Gynecology, Obstetrics and Gynecology  Why:  dressing removal  Contact information:  [address]  [address]  [place] [address]  [phone]                 Patient Instructions:     Diet general     Activity as tolerated     Call MD for:  temperature >100.4     Call MD for:  persistent nausea and vomiting or diarrhea     Call MD for:  severe uncontrolled pain     Call MD for:  redness, tenderness, or signs of infection (pain, swelling, redness, odor or green/yellow discharge around incision site)     Call MD for:  difficulty breathing or increased cough     Call MD for:  severe persistent headache     Call MD for:  worsening rash     Call MD for:  persistent dizziness, light-headedness, or visual disturbances     Call MD for:  increased confusion or weakness     No dressing needed       Medications:  Current Discharge Medication List      START taking these medications    Details   ibuprofen (ADVIL,MOTRIN) 600 MG tablet Take 1 tablet (600 mg total) by mouth every 6 (six) hours as needed.  Qty: 40 tablet, Refills: 1    Associated Diagnoses: Status post  section      oxyCODONE-acetaminophen (PERCOCET) 5-325 mg per tablet Take 1 tablet by mouth every 4 (four) hours as needed.  Qty: 30 tablet, Refills: 0    Associated Diagnoses: Status post  section         STOP taking these medications       PRENATAL PLUS, CALCIUM CARB, 27 mg iron- 1 mg Tab Comments:   Reason for Stopping:               Trenton Li MD  Obstetrics  [facility]

## 2017-11-30 NOTE — ASSESSMENT & PLAN NOTE
Patient discharged home on postpartum day #3, 11/30/2017   Discharge medications include Percocet, Motrin, prenatal vitamins, and iron supplement.  Follow-up with me next week for dressing removal.

## 2017-11-30 NOTE — PLAN OF CARE
Problem: Infection, Risk/Actual (Adult)  Goal: Identify Related Risk Factors and Signs and Symptoms  Related risk factors and signs and symptoms are identified upon initiation of Human Response Clinical Practice Guideline (CPG)   VSS. Afebrile. No s/s of infection.

## 2017-11-30 NOTE — PLAN OF CARE
Problem: Patient Care Overview  Goal: Discharge Needs Assessment  Outcome: Outcome(s) achieved Date Met: 11/30/17  VERBAL AND WRITTEN DISCHARGE INSTRUCTIONS GIVEN AND DISCUSSED TO PATIENTS SATISFACTION.

## 2017-12-02 ENCOUNTER — TELEPHONE (OUTPATIENT)
Dept: OBSTETRICS AND GYNECOLOGY | Facility: HOSPITAL | Age: 27
End: 2017-12-02

## 2017-12-02 NOTE — TELEPHONE ENCOUNTER
Spoke to pt who states baby breastfeeding well at home -breasts fill and baby softens them well -having no breast or nipple pain -baby with man wet and dirty diapers -had 3 overnight between 7pm and 8 Am this morning -has no questions or concerns at this time

## 2017-12-04 ENCOUNTER — POSTPARTUM VISIT (OUTPATIENT)
Dept: OBSTETRICS AND GYNECOLOGY | Facility: CLINIC | Age: 27
End: 2017-12-04
Payer: MEDICAID

## 2017-12-04 VITALS
TEMPERATURE: 99 F | HEIGHT: 67 IN | BODY MASS INDEX: 43.63 KG/M2 | SYSTOLIC BLOOD PRESSURE: 124 MMHG | WEIGHT: 278 LBS | DIASTOLIC BLOOD PRESSURE: 76 MMHG

## 2017-12-04 PROCEDURE — 99213 OFFICE O/P EST LOW 20 MIN: CPT | Mod: PBBFAC | Performed by: OBSTETRICS & GYNECOLOGY

## 2017-12-04 PROCEDURE — 99999 PR PBB SHADOW E&M-EST. PATIENT-LVL III: CPT | Mod: PBBFAC,,, | Performed by: OBSTETRICS & GYNECOLOGY

## 2017-12-04 NOTE — PROGRESS NOTES
Subjective:       Patient ID: Charlene Burgess is a 27 y.o. female.    Chief Complaint:  Postpartum Care (1 wk pp for RCS on 17)      History of Present Illness  HPI  Ms Burgess is a 27 years old, status post repeat low transverse  section with tubal ligation on 2017.  She comes in today for a postpartum exam and followup.  Patient has no current complaints.  No fever or chills.  No nausea or vomiting.  No diarrhea or constipation.  No abdominal or pelvic pain.    She has NOT resumed normal intercourse.  Patient has begun some walking for exercise. She denies having signs and symptoms of significant postpartum depression.  She is breast-feeding well.      GYN & OB History  Patient's last menstrual period was 2017.   Date of Last Pap: No result found    OB History    Para Term  AB Living   4 3 3   1 3   SAB TAB Ectopic Multiple Live Births   1     0 3      # Outcome Date GA Lbr Bhavin/2nd Weight Sex Delivery Anes PTL Lv   4 Term 17 38w1d  3.74 kg (8 lb 3.9 oz) F CS-LTranv Spinal N ROLANDO   3 Term 12 39w0d  3.147 kg (6 lb 15 oz) M CS-LTranv Spinal N ROLANDO      Birth Comments: System Generated. Please review and update pregnancy details.   2 Term 04/20/10 40w0d  3.345 kg (7 lb 6 oz) F CS-LTranv EPI N ROLANDO   1 SAB                 Past Medical History:   Diagnosis Date    Kidney stone        Past Surgical History:   Procedure Laterality Date     SECTION      DILATION AND CURETTAGE OF UTERUS      WRIST SURGERY  left wrist    occured at age 13    WRIST SURGERY         Family History   Problem Relation Age of Onset    Diabetes Neg Hx     Hypertension Neg Hx        Social History     Social History    Marital status: Legally      Spouse name: N/A    Number of children: N/A    Years of education: N/A     Social History Main Topics    Smoking status: Former Smoker     Packs/day: 0.25     Types: Cigarettes    Smokeless tobacco: Never Used     Alcohol use No    Drug use: No    Sexual activity: Yes     Partners: Male     Other Topics Concern    None     Social History Narrative    Together since 2016       Current Outpatient Prescriptions   Medication Sig Dispense Refill    ibuprofen (ADVIL,MOTRIN) 600 MG tablet Take 1 tablet (600 mg total) by mouth every 6 (six) hours as needed. 40 tablet 1    oxyCODONE-acetaminophen (PERCOCET) 5-325 mg per tablet Take 1 tablet by mouth every 4 (four) hours as needed. 30 tablet 0     No current facility-administered medications for this visit.        Review of patient's allergies indicates:   Allergen Reactions    Amoxicillin Anaphylaxis    Penicillins Itching     Unknown reaction         Review of Systems  Review of Systems   Constitutional: Positive for fatigue. Negative for activity change, appetite change, chills, fever and unexpected weight change.   HENT: Negative for mouth sores.    Respiratory: Negative for cough, shortness of breath and wheezing.    Cardiovascular: Negative for chest pain and palpitations.   Gastrointestinal: Positive for abdominal pain. Negative for bloating, blood in stool, constipation, nausea and vomiting.        Incisional pain   Endocrine: Negative for diabetes and hot flashes.   Genitourinary: Positive for vaginal bleeding and vaginal discharge. Negative for dyspareunia, dysuria, frequency, hematuria, menorrhagia, menstrual problem, pelvic pain, urgency, vaginal pain, dysmenorrhea, urinary incontinence, postcoital bleeding and vaginal odor.   Musculoskeletal: Negative for back pain and myalgias.   Skin:  Negative for rash.   Neurological: Negative for seizures and headaches.   Psychiatric/Behavioral: Negative for depression and sleep disturbance. The patient is not nervous/anxious.    Breast: Negative for breast mass, breast pain and nipple discharge          Objective:    Physical Exam:   Constitutional: She appears well-developed and well-nourished. No distress.    HENT:    Head: Normocephalic and atraumatic.    Eyes: EOM are normal.    Neck: Normal range of motion.    Cardiovascular: Normal rate.     Pulmonary/Chest: Effort normal. No respiratory distress.        Abdominal: Soft. She exhibits no distension. There is no tenderness. There is no rebound and no guarding.   Pfannenstiel incision in AquaCel dressing.  No evidence of active bleeding.  Dressing removed.  Incision is clean, dry, intact.  Healing well without any evidence of infection.               Musculoskeletal: Normal range of motion.       Neurological: She is alert.    Skin: Skin is warm and dry.    Psychiatric: She has a normal mood and affect.          Assessment:        1. Postpartum care and examination    2.  Status post repeat low transverse  section with tubal ligation         Plan:      I have discussed with the patient her condition.  She is doing well with respect to surgery.  She will continue to keep her incision clean and dry to promote proper healing.  She will be back in 4 weeks for follow-up.  She can come back sooner if she needs us.

## 2018-01-12 ENCOUNTER — POSTPARTUM VISIT (OUTPATIENT)
Dept: OBSTETRICS AND GYNECOLOGY | Facility: CLINIC | Age: 28
End: 2018-01-12
Payer: MEDICAID

## 2018-01-12 VITALS
DIASTOLIC BLOOD PRESSURE: 72 MMHG | TEMPERATURE: 99 F | WEIGHT: 273.38 LBS | HEIGHT: 67 IN | SYSTOLIC BLOOD PRESSURE: 120 MMHG | BODY MASS INDEX: 42.91 KG/M2

## 2018-01-12 PROCEDURE — 99213 OFFICE O/P EST LOW 20 MIN: CPT | Mod: PBBFAC | Performed by: OBSTETRICS & GYNECOLOGY

## 2018-01-12 PROCEDURE — 0503F POSTPARTUM CARE VISIT: CPT | Mod: S$PBB,,, | Performed by: OBSTETRICS & GYNECOLOGY

## 2018-01-12 PROCEDURE — 99999 PR PBB SHADOW E&M-EST. PATIENT-LVL III: CPT | Mod: PBBFAC,,, | Performed by: OBSTETRICS & GYNECOLOGY

## 2018-01-12 NOTE — PROGRESS NOTES
Subjective:       Patient ID: Charlene Burgess is a 27 y.o. female.    Chief Complaint:  Postpartum Care (6 wks pp for RCS on 17)      History of Present Illness  HPI  Ms Burgess is a 27 years old, status post repeat low transverse  section with tubal ligation on 2017.  She comes in today for a postpartum exam and followup.  Patient has no current complaints.  No fever or chills.  No nausea or vomiting.  No diarrhea or constipation.  No abdominal or pelvic pain.    She has NOT resumed normal intercourse.  Patient has begun some walking for exercise. She denies having signs and symptoms of significant postpartum depression.  She is breast-feeding well.     Still with leg pain though not as much.  Still with numbness on abdomen.      GYN & OB History  No LMP recorded.   Date of Last Pap: No result found    OB History    Para Term  AB Living   4 3 3   1 3   SAB TAB Ectopic Multiple Live Births   1     0 3      # Outcome Date GA Lbr Bhavin/2nd Weight Sex Delivery Anes PTL Lv   4 Term 17 38w1d  3.74 kg (8 lb 3.9 oz) F CS-LTranv Spinal N ROLANDO   3 Term 12 39w0d  3.147 kg (6 lb 15 oz) M CS-LTranv Spinal N ROLANDO      Birth Comments: System Generated. Please review and update pregnancy details.   2 Term 04/20/10 40w0d  3.345 kg (7 lb 6 oz) F CS-LTranv EPI N ROLANDO   1 SAB                 Past Medical History:   Diagnosis Date    Kidney stone        Past Surgical History:   Procedure Laterality Date     SECTION      DILATION AND CURETTAGE OF UTERUS      WRIST SURGERY  left wrist    occured at age 13    WRIST SURGERY         Family History   Problem Relation Age of Onset    Diabetes Neg Hx     Hypertension Neg Hx        Social History     Social History    Marital status: Legally      Spouse name: N/A    Number of children: N/A    Years of education: N/A     Social History Main Topics    Smoking status: Former Smoker     Packs/day: 0.25      Types: Cigarettes    Smokeless tobacco: Never Used    Alcohol use No    Drug use: No    Sexual activity: Yes     Partners: Male     Other Topics Concern    None     Social History Narrative    Together since 2016       Current Outpatient Prescriptions   Medication Sig Dispense Refill    ibuprofen (ADVIL,MOTRIN) 600 MG tablet Take 1 tablet (600 mg total) by mouth every 6 (six) hours as needed. 40 tablet 1     No current facility-administered medications for this visit.        Review of patient's allergies indicates:   Allergen Reactions    Amoxicillin Anaphylaxis    Penicillins Itching     Unknown reaction       Review of Systems  Review of Systems   Constitutional: Negative for activity change, appetite change, chills, fatigue, fever and unexpected weight change.   HENT: Negative for mouth sores.    Respiratory: Negative for cough, shortness of breath and wheezing.    Cardiovascular: Negative for chest pain and palpitations.   Gastrointestinal: Negative for abdominal pain, bloating, blood in stool, constipation, nausea and vomiting.        Abdominal numbness   Endocrine: Negative for diabetes and hot flashes.   Genitourinary: Positive for vaginal discharge. Negative for dyspareunia, dysuria, frequency, hematuria, menorrhagia, menstrual problem, pelvic pain, urgency, vaginal bleeding, vaginal pain, dysmenorrhea, urinary incontinence, postcoital bleeding and vaginal odor.   Musculoskeletal: Negative for back pain and myalgias.   Skin:  Negative for rash.   Neurological: Negative for seizures and headaches.   Psychiatric/Behavioral: Negative for depression and sleep disturbance. The patient is not nervous/anxious.    Breast: Negative for breast mass, breast pain and nipple discharge          Objective:    Physical Exam:   Constitutional: She appears well-developed and well-nourished. No distress.    HENT:   Head: Normocephalic and atraumatic.    Eyes: EOM are normal.    Neck: Normal range of motion.      Pulmonary/Chest: Effort normal. No respiratory distress.        Abdominal: Soft. She exhibits no distension. There is no tenderness. There is no rebound and no guarding.   Pfannenstiel incision healed     Genitourinary: Uterus normal. Vaginal discharge found.   Genitourinary Comments: Vulva without any obvious lesions.  Vaginal vault with good support.  Minimal brown discharge noted.  No obvious lesion.  Cervix is without any cervical motion tenderness.  No obvious lesion.  Uterus is small, non-tender, normal contour.  Adnexa is without any masses or tenderness.           Musculoskeletal: Normal range of motion.       Neurological: She is alert.    Skin: Skin is warm and dry.    Psychiatric: She has a normal mood and affect.          Assessment:        1. Postpartum care and examination immediately after delivery    2.  Status post  section         Plan:      I have discussed with the patient regarding her condition  She is doing well recovering from her surgery  She can slowly resume normal activities    Back in 3 months for Pap and annual

## 2018-01-29 ENCOUNTER — TELEPHONE (OUTPATIENT)
Dept: OBSTETRICS AND GYNECOLOGY | Facility: CLINIC | Age: 28
End: 2018-01-29

## 2018-01-29 NOTE — TELEPHONE ENCOUNTER
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 Pt can be reached at [phone]. Thank you!  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

## 2018-03-07 ENCOUNTER — HOSPITAL ENCOUNTER (EMERGENCY)
Facility: HOSPITAL | Age: 28
Discharge: HOME | End: 2018-03-07
Attending: EMERGENCY MEDICINE
Payer: MEDICAID

## 2018-03-07 VITALS
TEMPERATURE: 98 F | DIASTOLIC BLOOD PRESSURE: 72 MMHG | OXYGEN SATURATION: 97 % | SYSTOLIC BLOOD PRESSURE: 130 MMHG | HEART RATE: 83 BPM | BODY MASS INDEX: 42.06 KG/M2 | HEIGHT: 67 IN | WEIGHT: 268 LBS | RESPIRATION RATE: 17 BRPM

## 2018-03-07 DIAGNOSIS — J40 BRONCHITIS: Primary | ICD-10-CM

## 2018-03-07 DIAGNOSIS — R07.9 CHEST PAIN: ICD-10-CM

## 2018-03-07 PROCEDURE — 99900035 HC TECH TIME PER 15 MIN (STAT)

## 2018-03-07 PROCEDURE — 94640 AIRWAY INHALATION TREATMENT: CPT

## 2018-03-07 PROCEDURE — 94761 N-INVAS EAR/PLS OXIMETRY MLT: CPT

## 2018-03-07 PROCEDURE — 25000242 PHARM REV CODE 250 ALT 637 W/ HCPCS: Performed by: EMERGENCY MEDICINE

## 2018-03-07 PROCEDURE — 63600175 PHARM REV CODE 636 W HCPCS: Performed by: EMERGENCY MEDICINE

## 2018-03-07 PROCEDURE — 99284 EMERGENCY DEPT VISIT MOD MDM: CPT | Mod: 25

## 2018-03-07 PROCEDURE — 93010 ELECTROCARDIOGRAM REPORT: CPT | Mod: ,,, | Performed by: INTERNAL MEDICINE

## 2018-03-07 PROCEDURE — 96372 THER/PROPH/DIAG INJ SC/IM: CPT

## 2018-03-07 RX ORDER — ALBUTEROL SULFATE 2.5 MG/.5ML
2.5 SOLUTION RESPIRATORY (INHALATION)
Status: COMPLETED | OUTPATIENT
Start: 2018-03-07 | End: 2018-03-07

## 2018-03-07 RX ORDER — ALBUTEROL SULFATE 90 UG/1
1-2 AEROSOL, METERED RESPIRATORY (INHALATION) EVERY 6 HOURS PRN
Qty: 1 INHALER | Refills: 0 | Status: SHIPPED | OUTPATIENT
Start: 2018-03-07 | End: 2019-03-07

## 2018-03-07 RX ORDER — GUAIFENESIN 100 MG/5ML
200 SOLUTION ORAL 3 TIMES DAILY PRN
COMMUNITY
End: 2018-07-19 | Stop reason: ALTCHOICE

## 2018-03-07 RX ORDER — AZITHROMYCIN 250 MG/1
250 TABLET, FILM COATED ORAL DAILY
Qty: 6 TABLET | Refills: 0 | Status: SHIPPED | OUTPATIENT
Start: 2018-03-07 | End: 2018-07-19 | Stop reason: ALTCHOICE

## 2018-03-07 RX ORDER — DEXAMETHASONE SODIUM PHOSPHATE 4 MG/ML
12 INJECTION, SOLUTION INTRA-ARTICULAR; INTRALESIONAL; INTRAMUSCULAR; INTRAVENOUS; SOFT TISSUE
Status: COMPLETED | OUTPATIENT
Start: 2018-03-07 | End: 2018-03-07

## 2018-03-07 RX ORDER — DEXAMETHASONE 6 MG/1
12 TABLET ORAL ONCE
Qty: 2 TABLET | Refills: 0 | Status: SHIPPED | OUTPATIENT
Start: 2018-03-09 | End: 2018-03-09

## 2018-03-07 RX ORDER — ALBUTEROL SULFATE 90 UG/1
4 AEROSOL, METERED RESPIRATORY (INHALATION) ONCE
Status: COMPLETED | OUTPATIENT
Start: 2018-03-07 | End: 2018-03-07

## 2018-03-07 RX ADMIN — ALBUTEROL SULFATE 2.5 MG: 2.5 SOLUTION RESPIRATORY (INHALATION) at 12:03

## 2018-03-07 RX ADMIN — DEXAMETHASONE SODIUM PHOSPHATE 12 MG: 4 INJECTION, SOLUTION INTRAMUSCULAR; INTRAVENOUS at 12:03

## 2018-03-07 RX ADMIN — ALBUTEROL SULFATE 4 PUFF: 90 AEROSOL, METERED RESPIRATORY (INHALATION) at 01:03

## 2018-03-07 NOTE — ED PROVIDER NOTES
Encounter Date: 3/7/2018    SCRIBE #1 NOTE: I, Devorah Mosqueda, am scribing for, and in the presence of,  Layc Bettencourt MD. I have scribed the following portions of the note - Other sections scribed: HPI and ROS.       History     Chief Complaint   Patient presents with    Chest Pain     States earlier today and became harder to breathe and is also having chest tightness    Shortness of Breath     CC: Chest Pain    HPI: This 27 y.o female presents to the ED for an evaluation for a 1 day h/o chest pain described as a heaviness secondary to a cough.  Patient reports for 1 week, she has been having a productive cough with associated rhinorrhea and sore throat.  Patient reports today she became concerned due to the chest pain with cough and reports having streaks of blood to her mucous.  Patient reports 2 days ago, she began having a hoarse voice.  Patient also reports of bilateral ear fullness, diarrhea 3x per day, decreased appetite, sinus pain, post nasal drip, and fever with a temperature max of 101.7.  Patient reports of no relief with attempting treatment with Robitussin and reports taking Tylenol for her fever.  Patient denies nausea, emesis, diarrhea, abdominal pain, back pain, shortness of breath, rash, dysuria, or any other associated symptoms.  Patient reports she has been drinking an adequate amount of fluids since onset of her symptoms.  Patient also reports she is currently breast feeding.  No alleviating factors.      The history is provided by the patient. No  was used.     Review of patient's allergies indicates:   Allergen Reactions    Amoxicillin Anaphylaxis    Penicillins Itching     Unknown reaction     Past Medical History:   Diagnosis Date    Kidney stone      Past Surgical History:   Procedure Laterality Date     SECTION      DILATION AND CURETTAGE OF UTERUS      WRIST SURGERY  left wrist    occured at age 13    WRIST SURGERY       Family History    Problem Relation Age of Onset    Diabetes Neg Hx     Hypertension Neg Hx      Social History   Substance Use Topics    Smoking status: Former Smoker     Packs/day: 0.25     Types: Cigarettes    Smokeless tobacco: Never Used    Alcohol use No     Review of Systems   Constitutional: Positive for appetite change and fever. Negative for chills.   HENT: Positive for postnasal drip, rhinorrhea, sinus pain, sore throat and voice change. Negative for congestion and ear pain.    Eyes: Negative for pain.   Respiratory: Positive for cough. Negative for shortness of breath.    Cardiovascular: Positive for chest pain.   Gastrointestinal: Negative for abdominal pain, diarrhea, nausea and vomiting.   Genitourinary: Negative for dysuria.   Musculoskeletal: Negative for back pain.   Skin: Negative for rash.   Neurological: Negative for headaches.       Physical Exam     Initial Vitals [03/07/18 1045]   BP Pulse Resp Temp SpO2   120/78 79 16 98.4 °F (36.9 °C) 99 %      MAP       92         Physical Exam    Nursing note and vitals reviewed.  Constitutional: She appears well-developed and well-nourished.   HENT:   Head: Normocephalic and atraumatic.   Swollen turbinates, + congestion, no facial tenderness   Eyes: EOM and lids are normal.   Neck: Neck supple.   Cardiovascular: Normal rate and regular rhythm.   Pulmonary/Chest: No respiratory distress. She has wheezes.   Abdominal: Normal appearance.   Musculoskeletal: Normal range of motion.   Neurological: She is alert.   Skin: Skin is warm and dry.   Psychiatric: She has a normal mood and affect. Her behavior is normal. Thought content normal.         ED Course   Procedures  Labs Reviewed - No data to display  EKG Readings: (Independently Interpreted)   Initial Reading: No STEMI. Rhythm: Normal Sinus Rhythm. Heart Rate: 62. Ectopy: No Ectopy. Conduction: Normal. ST Segments: Normal ST Segments. T Waves: Normal. Clinical Impression: Normal Sinus Rhythm       X-Rays:    Independently Interpreted Readings:   Chest X-Ray: Normal heart size.  No infiltrates.  No acute abnormalities.     Medical Decision Making:   Independently Interpreted Test(s):   I have ordered and independently interpreted X-rays - see prior notes.  I have ordered and independently interpreted EKG Reading(s) - see prior notes  Clinical Tests:   Radiological Study: Ordered and Reviewed  ED Management:  27 year old female presents to the ED with fever, cough, congestion, chest heaviness.  She demonstrates frequent cough and significant expiratory wheezes on exam.  No history of previous wheeze.  No tobacco use.  DDx would include viral URI, acute bacterial sinusitis, influenza, pneumonia, bronchitis, foreign body.  Less likely ACS, PTX, anaphylaxis.  CXR clear without signs of lobar pneumonia.  Currently afebrile and out of the window to treat for influenza.  Patient treated with albuterol and decadron with significant improvement in breath sounds. I suspect that her symptoms are related to acute bronchitis.  Will treat with steroids, azithromycin, and albuterol inhaler.  Discussed breastfeeding options/effects with patient who agrees with previous medications in setting of breast feeding.  Discharge home with instructions to return to th ED if symptoms worsened in any way.             Scribe Attestation:   Scribe #1: I performed the above scribed service and the documentation accurately describes the services I performed. I attest to the accuracy of the note.    Attending Attestation:           Physician Attestation for Scribe:  Physician Attestation Statement for Scribe #1: I, Lacy Bettencourt MD, reviewed documentation, as scribed by Devorah Mosqueda in my presence, and it is both accurate and complete.                    Clinical Impression:   The primary encounter diagnosis was Bronchitis. A diagnosis of Chest pain was also pertinent to this visit.                           Lacy Bettencourt MD  03/30/18 [id number]

## 2018-03-07 NOTE — PROGRESS NOTES
03/07/18 1211   Patient Assessment/Suction   Level of Consciousness (AVPU) alert   Respiratory Effort Unlabored   Expansion/Accessory Muscles/Retractions expansion symmetric;no retractions   All Lung Fields Breath Sounds wheezes, expiratory   Rhythm/Pattern, Respiratory pattern regular;unlabored   PRE-TX-O2-ETCO2   O2 Device (Oxygen Therapy) room air   SpO2 100 %   Pulse Oximetry Type Intermittent   $ Pulse Oximetry - Multiple Charge Pulse Oximetry - Multiple   Pulse (!) 56   Resp 18   Aerosol Therapy   $ Aerosol Therapy Charges Aerosol Treatment   Respiratory Treatment Status given   SVN/Inhaler Treatment Route mask   Position During Treatment Sitting in chair   Patient Tolerance good   Post-Treatment   Post-treatment Heart Rate (beats/min) 55   Post-treatment Resp Rate (breaths/min) 18   All Fields Breath Sounds wheezes, expiratory;wheezes, inspiratory

## 2018-05-02 NOTE — PLAN OF CARE
Printed letter and faxed to Collin Gustafson at [phone] with confirmation Problem: Breastfeeding (Adult,Obstetrics,Pediatric)  Goal: Signs and Symptoms of Listed Potential Problems Will be Absent, Minimized or Managed (Breastfeeding)  Signs and symptoms of listed potential problems will be absent, minimized or managed by discharge/transition of care (reference Breastfeeding (Adult,Obstetrics,Pediatric) [facility]).   Outcome: Ongoing (interventions implemented as appropriate)  Encouraged to breastfeeding according to infant cue. Encouraged to feed 8 or more in 24. Understanding voiced.

## 2018-07-19 ENCOUNTER — HOSPITAL ENCOUNTER (EMERGENCY)
Facility: HOSPITAL | Age: 28
Discharge: HOME | End: 2018-07-19
Attending: EMERGENCY MEDICINE
Payer: OTHER GOVERNMENT

## 2018-07-19 VITALS
WEIGHT: 254 LBS | OXYGEN SATURATION: 98 % | SYSTOLIC BLOOD PRESSURE: 133 MMHG | RESPIRATION RATE: 20 BRPM | HEIGHT: 68 IN | HEART RATE: 86 BPM | BODY MASS INDEX: 38.49 KG/M2 | DIASTOLIC BLOOD PRESSURE: 87 MMHG | TEMPERATURE: 98 F

## 2018-07-19 DIAGNOSIS — M54.50 LOW BACK PAIN: ICD-10-CM
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
## 2018-07-19 DIAGNOSIS — M54.2 NECK PAIN: ICD-10-CM

## 2018-07-19 LAB
B-HCG UR QL: NEGATIVE
CTP QC/QA: YES

## 2018-07-19 PROCEDURE — 99284 EMERGENCY DEPT VISIT MOD MDM: CPT | Mod: 25

## 2018-07-19 PROCEDURE — 81025 URINE PREGNANCY TEST: CPT | Performed by: PHYSICIAN ASSISTANT

## 2018-07-19 PROCEDURE — 25000003 PHARM REV CODE 250: Performed by: NURSE PRACTITIONER

## 2018-07-19 PROCEDURE — 96372 THER/PROPH/DIAG INJ SC/IM: CPT

## 2018-07-19 PROCEDURE — 63600175 PHARM REV CODE 636 W HCPCS: Performed by: NURSE PRACTITIONER

## 2018-07-19 RX ORDER — CYCLOBENZAPRINE HCL 10 MG
10 TABLET ORAL 3 TIMES DAILY PRN
Qty: 15 TABLET | Refills: 0 | Status: SHIPPED | OUTPATIENT
Start: 2018-07-19 | End: 2018-07-24

## 2018-07-19 RX ORDER — GABAPENTIN 800 MG/1
800 TABLET ORAL 2 TIMES DAILY
COMMUNITY

## 2018-07-19 RX ORDER — LIDOCAINE 50 MG/G
1 PATCH TOPICAL
Status: DISCONTINUED | OUTPATIENT
Start: 2018-07-19 | End: 2018-07-19 | Stop reason: HOSPADM

## 2018-07-19 RX ORDER — IBUPROFEN 600 MG/1
600 TABLET ORAL EVERY 6 HOURS PRN
Qty: 20 TABLET | Refills: 0 | Status: SHIPPED | OUTPATIENT
Start: 2018-07-19

## 2018-07-19 RX ORDER — KETOROLAC TROMETHAMINE 30 MG/ML
15 INJECTION, SOLUTION INTRAMUSCULAR; INTRAVENOUS
Status: COMPLETED | OUTPATIENT
Start: 2018-07-19 | End: 2018-07-19

## 2018-07-19 RX ADMIN — LIDOCAINE 1 PATCH: 50 PATCH TOPICAL at 06:07

## 2018-07-19 RX ADMIN — KETOROLAC TROMETHAMINE 15 MG: 30 INJECTION, SOLUTION INTRAMUSCULAR at 06:07
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
## 2024-01-09 NOTE — DISCHARGE SUMMARY
[facility]  Obstetrics  Discharge Summary      Patient Name: Charlene Burgess  MRN: [id number]  Admission Date: 10/10/2017  Hospital Length of Stay: 1 days  Discharge Date and Time:  10/10/2017 9:11 AM  Attending Physician: Trenton Li MD   Discharging Provider: Trenton Li MD  Primary Care Provider: Ranjeet Coffman III, MD    HPI: 28 yo  at 31w2d  Came in today with abdominal pain since earlier today, 10/10/2017  No contractions.  No vaginal bleeding.  No rupture of membranes  Good fetal movements    Procedure(s) (LRB):  DELIVERY- SECTION WITH TUBAL (N/A)     Hospital Course:   On Labor and Delivery, no obvious contractions  No bleeding  Vitals stable    Exam with closed cervix and no fundal tenderness        Final Active Diagnoses:    Diagnosis Date Noted POA    PRINCIPAL PROBLEM:  Abdominal pain affecting pregnancy, antepartum [O26.899, R10.9] 10/10/2017 Yes    31 weeks gestation of pregnancy [Z3A.31] 10/10/2017 Not Applicable    Previous  section complicating pregnancy, antepartum condition or complication [O34.219] 2017 Yes    Rh negative state in antepartum period, third trimester [O09.893] 2017 Not Applicable      Problems Resolved During this Admission:    Diagnosis Date Noted Date Resolved POA        Labs: All labs within the past 24 hours have been reviewed      Immunizations     None          This patient has no babies on file.  Pending Diagnostic Studies:     None          Discharged Condition: good    Disposition: Home or Self Care    Follow Up:  Follow-up Information     Trenton Li MD In 1 week.    Specialties:  Obstetrics and Gynecology, Obstetrics and Gynecology  Contact information:  [address] [place] [id number] [phone]                 Patient Instructions:     Diet general     Activity as tolerated     Call MD for:  temperature >100.4     Call MD for:  persistent nausea and vomiting or diarrhea     Call MD  for:  severe uncontrolled pain     Call MD for:  redness, tenderness, or signs of infection (pain, swelling, redness, odor or green/yellow discharge around incision site)     Call MD for:  difficulty breathing or increased cough     Call MD for:  severe persistent headache     Call MD for:  worsening rash     Call MD for:  persistent dizziness, light-headedness, or visual disturbances     Call MD for:  increased confusion or weakness     No dressing needed       Medications:  Current Discharge Medication List      CONTINUE these medications which have NOT CHANGED    Details   PRENATAL PLUS, CALCIUM CARB, 27 mg iron- 1 mg Tab Take 1 tablet (1 each total) by mouth once daily.  Qty: 30 tablet, Refills: 6    Associated Diagnoses: 16 weeks gestation of pregnancy             Trenton Li MD  Obstetrics  [facility]   Alert-The patient is alert, awake and responds to voice. The patient is oriented to time, place, and person. The triage nurse is able to obtain subjective information.

## (undated) DEVICE — DRESSING AQUACEL AG ADV 3.5X12